# Patient Record
Sex: MALE | Race: BLACK OR AFRICAN AMERICAN | NOT HISPANIC OR LATINO | Employment: FULL TIME | URBAN - METROPOLITAN AREA
[De-identification: names, ages, dates, MRNs, and addresses within clinical notes are randomized per-mention and may not be internally consistent; named-entity substitution may affect disease eponyms.]

---

## 2017-01-11 ENCOUNTER — ALLSCRIPTS OFFICE VISIT (OUTPATIENT)
Dept: OTHER | Facility: OTHER | Age: 47
End: 2017-01-11

## 2017-04-01 ENCOUNTER — APPOINTMENT (EMERGENCY)
Dept: RADIOLOGY | Facility: HOSPITAL | Age: 47
End: 2017-04-01
Payer: COMMERCIAL

## 2017-04-01 ENCOUNTER — HOSPITAL ENCOUNTER (EMERGENCY)
Facility: HOSPITAL | Age: 47
Discharge: HOME | End: 2017-04-01
Attending: EMERGENCY MEDICINE | Admitting: EMERGENCY MEDICINE
Payer: COMMERCIAL

## 2017-04-01 ENCOUNTER — APPOINTMENT (EMERGENCY)
Dept: CT IMAGING | Facility: HOSPITAL | Age: 47
End: 2017-04-01
Payer: COMMERCIAL

## 2017-04-01 VITALS
WEIGHT: 197.53 LBS | TEMPERATURE: 99.6 F | SYSTOLIC BLOOD PRESSURE: 135 MMHG | DIASTOLIC BLOOD PRESSURE: 67 MMHG | RESPIRATION RATE: 20 BRPM | HEART RATE: 96 BPM | OXYGEN SATURATION: 96 %

## 2017-04-01 DIAGNOSIS — E86.0 DEHYDRATION: ICD-10-CM

## 2017-04-01 DIAGNOSIS — J02.0 STREP PHARYNGITIS: Primary | ICD-10-CM

## 2017-04-01 LAB
ANION GAP SERPL CALCULATED.3IONS-SCNC: 12 MMOL/L (ref 4–13)
APTT PPP: 38 SECONDS (ref 24–36)
BASOPHILS # BLD AUTO: 0.05 THOUSANDS/ΜL (ref 0–0.1)
BASOPHILS NFR BLD AUTO: 0 % (ref 0–1)
BUN SERPL-MCNC: 11 MG/DL (ref 5–25)
CALCIUM SERPL-MCNC: 8.9 MG/DL (ref 8.3–10.1)
CHLORIDE SERPL-SCNC: 95 MMOL/L (ref 100–108)
CO2 SERPL-SCNC: 24 MMOL/L (ref 21–32)
CREAT SERPL-MCNC: 1.38 MG/DL (ref 0.6–1.3)
EOSINOPHIL # BLD AUTO: 0 THOUSAND/ΜL (ref 0–0.61)
EOSINOPHIL NFR BLD AUTO: 0 % (ref 0–6)
ERYTHROCYTE [DISTWIDTH] IN BLOOD BY AUTOMATED COUNT: 15.9 % (ref 11.6–15.1)
GFR SERPL CREATININE-BSD FRML MDRD: >60 ML/MIN/1.73SQ M
GLUCOSE SERPL-MCNC: 123 MG/DL (ref 65–140)
HCT VFR BLD AUTO: 43.1 % (ref 36.5–49.3)
HGB BLD-MCNC: 14.7 G/DL (ref 12–17)
INR PPP: 1.36 (ref 0.86–1.16)
LACTATE SERPL-SCNC: 1.9 MMOL/L (ref 0.5–2)
LYMPHOCYTES # BLD AUTO: 2.16 THOUSANDS/ΜL (ref 0.6–4.47)
LYMPHOCYTES NFR BLD AUTO: 12 % (ref 14–44)
MCH RBC QN AUTO: 32 PG (ref 26.8–34.3)
MCHC RBC AUTO-ENTMCNC: 34.1 G/DL (ref 31.4–37.4)
MCV RBC AUTO: 94 FL (ref 82–98)
MONOCYTES # BLD AUTO: 2.95 THOUSAND/ΜL (ref 0.17–1.22)
MONOCYTES NFR BLD AUTO: 16 % (ref 4–12)
NEUTROPHILS # BLD AUTO: 13.45 THOUSANDS/ΜL (ref 1.85–7.62)
NEUTS SEG NFR BLD AUTO: 72 % (ref 43–75)
PLATELET # BLD AUTO: 139 THOUSANDS/UL (ref 149–390)
PMV BLD AUTO: 11.1 FL (ref 8.9–12.7)
POTASSIUM SERPL-SCNC: 4.3 MMOL/L (ref 3.5–5.3)
PROTHROMBIN TIME: 16.4 SECONDS (ref 12–14.3)
RBC # BLD AUTO: 4.59 MILLION/UL (ref 3.88–5.62)
SODIUM SERPL-SCNC: 131 MMOL/L (ref 136–145)
WBC # BLD AUTO: 18.61 THOUSAND/UL (ref 4.31–10.16)

## 2017-04-01 PROCEDURE — 96374 THER/PROPH/DIAG INJ IV PUSH: CPT

## 2017-04-01 PROCEDURE — 96375 TX/PRO/DX INJ NEW DRUG ADDON: CPT

## 2017-04-01 PROCEDURE — 80048 BASIC METABOLIC PNL TOTAL CA: CPT | Performed by: PHYSICIAN ASSISTANT

## 2017-04-01 PROCEDURE — 70491 CT SOFT TISSUE NECK W/DYE: CPT

## 2017-04-01 PROCEDURE — 83605 ASSAY OF LACTIC ACID: CPT | Performed by: PHYSICIAN ASSISTANT

## 2017-04-01 PROCEDURE — 85610 PROTHROMBIN TIME: CPT | Performed by: PHYSICIAN ASSISTANT

## 2017-04-01 PROCEDURE — 99284 EMERGENCY DEPT VISIT MOD MDM: CPT

## 2017-04-01 PROCEDURE — 87040 BLOOD CULTURE FOR BACTERIA: CPT | Performed by: PHYSICIAN ASSISTANT

## 2017-04-01 PROCEDURE — 85730 THROMBOPLASTIN TIME PARTIAL: CPT | Performed by: PHYSICIAN ASSISTANT

## 2017-04-01 PROCEDURE — 71020 HB CHEST X-RAY 2VW FRONTAL&LATL: CPT

## 2017-04-01 PROCEDURE — 96361 HYDRATE IV INFUSION ADD-ON: CPT

## 2017-04-01 PROCEDURE — 36415 COLL VENOUS BLD VENIPUNCTURE: CPT | Performed by: PHYSICIAN ASSISTANT

## 2017-04-01 PROCEDURE — 85025 COMPLETE CBC W/AUTO DIFF WBC: CPT | Performed by: PHYSICIAN ASSISTANT

## 2017-04-01 RX ORDER — AMOXICILLIN 500 MG/1
1000 CAPSULE ORAL DAILY
Qty: 18 CAPSULE | Refills: 0 | Status: SHIPPED | OUTPATIENT
Start: 2017-04-01 | End: 2017-04-04 | Stop reason: HOSPADM

## 2017-04-01 RX ORDER — KETOROLAC TROMETHAMINE 30 MG/ML
15 INJECTION, SOLUTION INTRAMUSCULAR; INTRAVENOUS ONCE
Status: COMPLETED | OUTPATIENT
Start: 2017-04-01 | End: 2017-04-01

## 2017-04-01 RX ORDER — AMOXICILLIN 250 MG/1
1000 CAPSULE ORAL ONCE
Status: COMPLETED | OUTPATIENT
Start: 2017-04-01 | End: 2017-04-01

## 2017-04-01 RX ORDER — MORPHINE SULFATE 4 MG/ML
4 INJECTION, SOLUTION INTRAMUSCULAR; INTRAVENOUS ONCE
Status: COMPLETED | OUTPATIENT
Start: 2017-04-01 | End: 2017-04-01

## 2017-04-01 RX ADMIN — KETOROLAC TROMETHAMINE 15 MG: 30 INJECTION, SOLUTION INTRAMUSCULAR at 11:38

## 2017-04-01 RX ADMIN — DEXAMETHASONE SODIUM PHOSPHATE 10 MG: 10 INJECTION INTRAMUSCULAR; INTRAVENOUS at 11:28

## 2017-04-01 RX ADMIN — MORPHINE SULFATE 4 MG: 4 INJECTION, SOLUTION INTRAMUSCULAR; INTRAVENOUS at 12:40

## 2017-04-01 RX ADMIN — AMOXICILLIN 1000 MG: 250 CAPSULE ORAL at 11:28

## 2017-04-01 RX ADMIN — IOHEXOL 85 ML: 350 INJECTION, SOLUTION INTRAVENOUS at 12:30

## 2017-04-01 RX ADMIN — SODIUM CHLORIDE 1000 ML: 0.9 INJECTION, SOLUTION INTRAVENOUS at 11:38

## 2017-04-02 ENCOUNTER — HOSPITAL ENCOUNTER (INPATIENT)
Facility: HOSPITAL | Age: 47
LOS: 1 days | Discharge: HOME | DRG: 153 | End: 2017-04-04
Attending: EMERGENCY MEDICINE | Admitting: INTERNAL MEDICINE
Payer: COMMERCIAL

## 2017-04-02 ENCOUNTER — APPOINTMENT (EMERGENCY)
Dept: CT IMAGING | Facility: HOSPITAL | Age: 47
DRG: 153 | End: 2017-04-02
Payer: COMMERCIAL

## 2017-04-02 DIAGNOSIS — J03.90 TONSILLITIS, PHLEGMONOUS: ICD-10-CM

## 2017-04-02 DIAGNOSIS — J02.0 STREP PHARYNGITIS: Primary | ICD-10-CM

## 2017-04-02 PROBLEM — B37.0 ORAL CANDIDIASIS: Status: ACTIVE | Noted: 2017-04-02

## 2017-04-02 PROBLEM — J36 TONSILLAR ABSCESS: Status: ACTIVE | Noted: 2017-04-02

## 2017-04-02 LAB
ANION GAP SERPL CALCULATED.3IONS-SCNC: 10 MMOL/L (ref 4–13)
ANISOCYTOSIS BLD QL SMEAR: PRESENT
BASOPHILS # BLD MANUAL: 0 THOUSAND/UL (ref 0–0.1)
BASOPHILS NFR MAR MANUAL: 0 % (ref 0–1)
BUN SERPL-MCNC: 22 MG/DL (ref 5–25)
CALCIUM SERPL-MCNC: 9.5 MG/DL (ref 8.3–10.1)
CHLORIDE SERPL-SCNC: 95 MMOL/L (ref 100–108)
CO2 SERPL-SCNC: 30 MMOL/L (ref 21–32)
CREAT SERPL-MCNC: 1.11 MG/DL (ref 0.6–1.3)
EOSINOPHIL # BLD MANUAL: 0 THOUSAND/UL (ref 0–0.4)
EOSINOPHIL NFR BLD MANUAL: 0 % (ref 0–6)
ERYTHROCYTE [DISTWIDTH] IN BLOOD BY AUTOMATED COUNT: 16.1 % (ref 11.6–15.1)
GFR SERPL CREATININE-BSD FRML MDRD: >60 ML/MIN/1.73SQ M
GLUCOSE SERPL-MCNC: 134 MG/DL (ref 65–140)
HCT VFR BLD AUTO: 44.9 % (ref 36.5–49.3)
HGB BLD-MCNC: 15.3 G/DL (ref 12–17)
LACTATE SERPL-SCNC: 1.9 MMOL/L (ref 0.5–2)
LYMPHOCYTES # BLD AUTO: 1.76 THOUSAND/UL (ref 0.6–4.47)
LYMPHOCYTES # BLD AUTO: 6 % (ref 14–44)
MCH RBC QN AUTO: 32.1 PG (ref 26.8–34.3)
MCHC RBC AUTO-ENTMCNC: 34.1 G/DL (ref 31.4–37.4)
MCV RBC AUTO: 94 FL (ref 82–98)
METAMYELOCYTES NFR BLD MANUAL: 1 % (ref 0–1)
MONOCYTES # BLD AUTO: 1.76 THOUSAND/UL (ref 0–1.22)
MONOCYTES NFR BLD: 6 % (ref 4–12)
NEUTROPHILS # BLD MANUAL: 25.47 THOUSAND/UL (ref 1.85–7.62)
NEUTS BAND NFR BLD MANUAL: 1 % (ref 0–8)
NEUTS SEG NFR BLD AUTO: 86 % (ref 43–75)
PLATELET # BLD AUTO: 173 THOUSANDS/UL (ref 149–390)
PLATELET BLD QL SMEAR: ADEQUATE
PMV BLD AUTO: 11.2 FL (ref 8.9–12.7)
POTASSIUM SERPL-SCNC: 5.1 MMOL/L (ref 3.5–5.3)
RBC # BLD AUTO: 4.77 MILLION/UL (ref 3.88–5.62)
SODIUM SERPL-SCNC: 135 MMOL/L (ref 136–145)
TOTAL CELLS COUNTED SPEC: 100
TOXIC GRANULES BLD QL SMEAR: PRESENT
WBC # BLD AUTO: 29.28 THOUSAND/UL (ref 4.31–10.16)
WBC TOXIC VACUOLES BLD QL SMEAR: PRESENT

## 2017-04-02 PROCEDURE — 85027 COMPLETE CBC AUTOMATED: CPT | Performed by: EMERGENCY MEDICINE

## 2017-04-02 PROCEDURE — 83605 ASSAY OF LACTIC ACID: CPT | Performed by: EMERGENCY MEDICINE

## 2017-04-02 PROCEDURE — 96376 TX/PRO/DX INJ SAME DRUG ADON: CPT

## 2017-04-02 PROCEDURE — 70491 CT SOFT TISSUE NECK W/DYE: CPT

## 2017-04-02 PROCEDURE — 99285 EMERGENCY DEPT VISIT HI MDM: CPT

## 2017-04-02 PROCEDURE — 85007 BL SMEAR W/DIFF WBC COUNT: CPT | Performed by: EMERGENCY MEDICINE

## 2017-04-02 PROCEDURE — 87040 BLOOD CULTURE FOR BACTERIA: CPT | Performed by: EMERGENCY MEDICINE

## 2017-04-02 PROCEDURE — 36415 COLL VENOUS BLD VENIPUNCTURE: CPT | Performed by: EMERGENCY MEDICINE

## 2017-04-02 PROCEDURE — 80048 BASIC METABOLIC PNL TOTAL CA: CPT | Performed by: EMERGENCY MEDICINE

## 2017-04-02 PROCEDURE — 96365 THER/PROPH/DIAG IV INF INIT: CPT

## 2017-04-02 PROCEDURE — 96375 TX/PRO/DX INJ NEW DRUG ADDON: CPT

## 2017-04-02 RX ORDER — METOPROLOL SUCCINATE 50 MG/1
50 TABLET, EXTENDED RELEASE ORAL DAILY
COMMUNITY
End: 2018-05-23 | Stop reason: ALTCHOICE

## 2017-04-02 RX ORDER — SODIUM CHLORIDE 9 MG/ML
100 INJECTION, SOLUTION INTRAVENOUS CONTINUOUS
Status: DISCONTINUED | OUTPATIENT
Start: 2017-04-02 | End: 2017-04-04 | Stop reason: HOSPADM

## 2017-04-02 RX ORDER — ONDANSETRON 2 MG/ML
4 INJECTION INTRAMUSCULAR; INTRAVENOUS EVERY 4 HOURS PRN
Status: DISCONTINUED | OUTPATIENT
Start: 2017-04-02 | End: 2017-04-04 | Stop reason: HOSPADM

## 2017-04-02 RX ORDER — CLINDAMYCIN PHOSPHATE 600 MG/50ML
600 INJECTION INTRAVENOUS EVERY 8 HOURS
Status: DISCONTINUED | OUTPATIENT
Start: 2017-04-03 | End: 2017-04-04 | Stop reason: HOSPADM

## 2017-04-02 RX ORDER — ONDANSETRON 2 MG/ML
4 INJECTION INTRAMUSCULAR; INTRAVENOUS ONCE
Status: COMPLETED | OUTPATIENT
Start: 2017-04-02 | End: 2017-04-02

## 2017-04-02 RX ORDER — MORPHINE SULFATE 4 MG/ML
4 INJECTION, SOLUTION INTRAMUSCULAR; INTRAVENOUS EVERY 4 HOURS PRN
Status: DISCONTINUED | OUTPATIENT
Start: 2017-04-02 | End: 2017-04-04 | Stop reason: HOSPADM

## 2017-04-02 RX ORDER — ACETAMINOPHEN 325 MG/1
650 TABLET ORAL EVERY 6 HOURS PRN
Status: DISCONTINUED | OUTPATIENT
Start: 2017-04-02 | End: 2017-04-04 | Stop reason: HOSPADM

## 2017-04-02 RX ORDER — CLINDAMYCIN PHOSPHATE 600 MG/50ML
600 INJECTION INTRAVENOUS ONCE
Status: COMPLETED | OUTPATIENT
Start: 2017-04-02 | End: 2017-04-02

## 2017-04-02 RX ORDER — MORPHINE SULFATE 4 MG/ML
4 INJECTION, SOLUTION INTRAMUSCULAR; INTRAVENOUS ONCE
Status: COMPLETED | OUTPATIENT
Start: 2017-04-02 | End: 2017-04-02

## 2017-04-02 RX ORDER — NICOTINE 21 MG/24HR
1 PATCH, TRANSDERMAL 24 HOURS TRANSDERMAL DAILY
Status: DISCONTINUED | OUTPATIENT
Start: 2017-04-03 | End: 2017-04-04 | Stop reason: HOSPADM

## 2017-04-02 RX ORDER — METOPROLOL SUCCINATE 50 MG/1
50 TABLET, EXTENDED RELEASE ORAL DAILY
Status: DISCONTINUED | OUTPATIENT
Start: 2017-04-03 | End: 2017-04-04 | Stop reason: HOSPADM

## 2017-04-02 RX ORDER — ONDANSETRON 2 MG/ML
INJECTION INTRAMUSCULAR; INTRAVENOUS
Status: DISPENSED
Start: 2017-04-02 | End: 2017-04-03

## 2017-04-02 RX ADMIN — ONDANSETRON 4 MG: 2 INJECTION INTRAMUSCULAR; INTRAVENOUS at 21:15

## 2017-04-02 RX ADMIN — MORPHINE SULFATE 4 MG: 4 INJECTION, SOLUTION INTRAMUSCULAR; INTRAVENOUS at 19:19

## 2017-04-02 RX ADMIN — ONDANSETRON 4 MG: 2 INJECTION INTRAMUSCULAR; INTRAVENOUS at 19:19

## 2017-04-02 RX ADMIN — CLINDAMYCIN PHOSPHATE 600 MG: 12 INJECTION, SOLUTION INTRAVENOUS at 19:36

## 2017-04-02 RX ADMIN — SODIUM CHLORIDE 100 ML/HR: 0.9 INJECTION, SOLUTION INTRAVENOUS at 19:34

## 2017-04-02 RX ADMIN — IOHEXOL 85 ML: 350 INJECTION, SOLUTION INTRAVENOUS at 20:47

## 2017-04-03 PROBLEM — I10 ESSENTIAL HYPERTENSION: Status: ACTIVE | Noted: 2017-04-03

## 2017-04-03 PROBLEM — I42.0 DILATED CARDIOMYOPATHY (HCC): Status: ACTIVE | Noted: 2017-04-03

## 2017-04-03 PROBLEM — I50.22 CHRONIC SYSTOLIC HEART FAILURE (HCC): Status: ACTIVE | Noted: 2017-04-03

## 2017-04-03 LAB
ANION GAP SERPL CALCULATED.3IONS-SCNC: 8 MMOL/L (ref 4–13)
BUN SERPL-MCNC: 24 MG/DL (ref 5–25)
C DIFF TOX GENS STL QL NAA+PROBE: NORMAL
CALCIUM SERPL-MCNC: 8.7 MG/DL (ref 8.3–10.1)
CHLORIDE SERPL-SCNC: 99 MMOL/L (ref 100–108)
CO2 SERPL-SCNC: 28 MMOL/L (ref 21–32)
CREAT SERPL-MCNC: 1.17 MG/DL (ref 0.6–1.3)
ERYTHROCYTE [DISTWIDTH] IN BLOOD BY AUTOMATED COUNT: 16 % (ref 11.6–15.1)
GFR SERPL CREATININE-BSD FRML MDRD: >60 ML/MIN/1.73SQ M
GLUCOSE P FAST SERPL-MCNC: 188 MG/DL (ref 65–99)
GLUCOSE SERPL-MCNC: 188 MG/DL (ref 65–140)
HCT VFR BLD AUTO: 39.4 % (ref 36.5–49.3)
HGB BLD-MCNC: 13.4 G/DL (ref 12–17)
MCH RBC QN AUTO: 32.2 PG (ref 26.8–34.3)
MCHC RBC AUTO-ENTMCNC: 34 G/DL (ref 31.4–37.4)
MCV RBC AUTO: 95 FL (ref 82–98)
PLATELET # BLD AUTO: 167 THOUSANDS/UL (ref 149–390)
PMV BLD AUTO: 11.4 FL (ref 8.9–12.7)
POTASSIUM SERPL-SCNC: 4.3 MMOL/L (ref 3.5–5.3)
RBC # BLD AUTO: 4.16 MILLION/UL (ref 3.88–5.62)
SODIUM SERPL-SCNC: 135 MMOL/L (ref 136–145)
WBC # BLD AUTO: 25.08 THOUSAND/UL (ref 4.31–10.16)

## 2017-04-03 PROCEDURE — 85027 COMPLETE CBC AUTOMATED: CPT | Performed by: PHYSICIAN ASSISTANT

## 2017-04-03 PROCEDURE — 87493 C DIFF AMPLIFIED PROBE: CPT | Performed by: INTERNAL MEDICINE

## 2017-04-03 PROCEDURE — 80048 BASIC METABOLIC PNL TOTAL CA: CPT | Performed by: PHYSICIAN ASSISTANT

## 2017-04-03 RX ADMIN — NYSTATIN 500000 UNITS: 100000 SUSPENSION ORAL at 00:27

## 2017-04-03 RX ADMIN — DEXAMETHASONE SODIUM PHOSPHATE 6 MG: 10 INJECTION, SOLUTION INTRAMUSCULAR; INTRAVENOUS at 23:55

## 2017-04-03 RX ADMIN — DEXAMETHASONE SODIUM PHOSPHATE 10 MG: 10 INJECTION, SOLUTION INTRAMUSCULAR; INTRAVENOUS at 01:00

## 2017-04-03 RX ADMIN — METOPROLOL SUCCINATE 50 MG: 50 TABLET, FILM COATED, EXTENDED RELEASE ORAL at 08:31

## 2017-04-03 RX ADMIN — NICOTINE 1 PATCH: 14 PATCH TRANSDERMAL at 08:31

## 2017-04-03 RX ADMIN — CLINDAMYCIN PHOSPHATE 600 MG: 12 INJECTION, SOLUTION INTRAMUSCULAR; INTRAVENOUS at 04:00

## 2017-04-03 RX ADMIN — NYSTATIN 500000 UNITS: 100000 SUSPENSION ORAL at 22:12

## 2017-04-03 RX ADMIN — CLINDAMYCIN PHOSPHATE 600 MG: 12 INJECTION, SOLUTION INTRAMUSCULAR; INTRAVENOUS at 19:49

## 2017-04-03 RX ADMIN — DEXAMETHASONE SODIUM PHOSPHATE 10 MG: 10 INJECTION, SOLUTION INTRAMUSCULAR; INTRAVENOUS at 08:40

## 2017-04-03 RX ADMIN — NYSTATIN 500000 UNITS: 100000 SUSPENSION ORAL at 08:31

## 2017-04-03 RX ADMIN — MORPHINE SULFATE 4 MG: 4 INJECTION, SOLUTION INTRAMUSCULAR; INTRAVENOUS at 07:40

## 2017-04-03 RX ADMIN — ENOXAPARIN SODIUM 40 MG: 40 INJECTION SUBCUTANEOUS at 08:31

## 2017-04-03 RX ADMIN — CLINDAMYCIN PHOSPHATE 600 MG: 12 INJECTION, SOLUTION INTRAMUSCULAR; INTRAVENOUS at 10:51

## 2017-04-03 RX ADMIN — MORPHINE SULFATE 4 MG: 4 INJECTION, SOLUTION INTRAMUSCULAR; INTRAVENOUS at 00:26

## 2017-04-03 RX ADMIN — DEXAMETHASONE SODIUM PHOSPHATE 6 MG: 10 INJECTION, SOLUTION INTRAMUSCULAR; INTRAVENOUS at 17:15

## 2017-04-03 RX ADMIN — NYSTATIN 500000 UNITS: 100000 SUSPENSION ORAL at 12:41

## 2017-04-03 RX ADMIN — NYSTATIN 500000 UNITS: 100000 SUSPENSION ORAL at 17:15

## 2017-04-03 RX ADMIN — SODIUM CHLORIDE 100 ML/HR: 0.9 INJECTION, SOLUTION INTRAVENOUS at 22:12

## 2017-04-03 RX ADMIN — SODIUM CHLORIDE 100 ML/HR: 0.9 INJECTION, SOLUTION INTRAVENOUS at 00:27

## 2017-04-04 VITALS
BODY MASS INDEX: 29.36 KG/M2 | SYSTOLIC BLOOD PRESSURE: 149 MMHG | HEIGHT: 69 IN | RESPIRATION RATE: 19 BRPM | OXYGEN SATURATION: 96 % | DIASTOLIC BLOOD PRESSURE: 70 MMHG | HEART RATE: 50 BPM | WEIGHT: 198.19 LBS | TEMPERATURE: 97.5 F

## 2017-04-04 RX ORDER — CLINDAMYCIN HYDROCHLORIDE 300 MG/1
300 CAPSULE ORAL 3 TIMES DAILY
Qty: 24 CAPSULE | Refills: 0 | Status: SHIPPED | OUTPATIENT
Start: 2017-04-04 | End: 2017-04-12

## 2017-04-04 RX ORDER — ACETAMINOPHEN 325 MG/1
650 TABLET ORAL EVERY 6 HOURS PRN
Qty: 30 TABLET | Refills: 0 | Status: SHIPPED | OUTPATIENT
Start: 2017-04-04 | End: 2017-05-04

## 2017-04-04 RX ORDER — DEXAMETHASONE 4 MG/1
4 TABLET ORAL 2 TIMES DAILY WITH MEALS
Qty: 6 TABLET | Refills: 0 | Status: SHIPPED | OUTPATIENT
Start: 2017-04-04 | End: 2017-04-07

## 2017-04-04 RX ORDER — NICOTINE 21 MG/24HR
1 PATCH, TRANSDERMAL 24 HOURS TRANSDERMAL DAILY
Qty: 30 PATCH | Refills: 0 | Status: SHIPPED | OUTPATIENT
Start: 2017-04-04 | End: 2017-05-04

## 2017-04-04 RX ADMIN — METOPROLOL SUCCINATE 50 MG: 50 TABLET, FILM COATED, EXTENDED RELEASE ORAL at 08:42

## 2017-04-04 RX ADMIN — NYSTATIN 500000 UNITS: 100000 SUSPENSION ORAL at 08:42

## 2017-04-04 RX ADMIN — DEXAMETHASONE SODIUM PHOSPHATE 6 MG: 10 INJECTION, SOLUTION INTRAMUSCULAR; INTRAVENOUS at 07:52

## 2017-04-04 RX ADMIN — NICOTINE 1 PATCH: 14 PATCH TRANSDERMAL at 08:43

## 2017-04-04 RX ADMIN — CLINDAMYCIN PHOSPHATE 600 MG: 12 INJECTION, SOLUTION INTRAMUSCULAR; INTRAVENOUS at 04:00

## 2017-04-04 RX ADMIN — ENOXAPARIN SODIUM 40 MG: 40 INJECTION SUBCUTANEOUS at 08:42

## 2017-04-06 LAB
BACTERIA BLD CULT: NORMAL
BACTERIA BLD CULT: NORMAL

## 2017-04-08 LAB
BACTERIA BLD CULT: NORMAL
BACTERIA BLD CULT: NORMAL

## 2017-04-19 ENCOUNTER — ALLSCRIPTS OFFICE VISIT (OUTPATIENT)
Dept: OTHER | Facility: OTHER | Age: 47
End: 2017-04-19

## 2017-05-01 ENCOUNTER — ALLSCRIPTS OFFICE VISIT (OUTPATIENT)
Dept: OTHER | Facility: OTHER | Age: 47
End: 2017-05-01

## 2017-09-11 ENCOUNTER — ALLSCRIPTS OFFICE VISIT (OUTPATIENT)
Dept: OTHER | Facility: OTHER | Age: 47
End: 2017-09-11

## 2017-11-16 ENCOUNTER — ALLSCRIPTS OFFICE VISIT (OUTPATIENT)
Dept: OTHER | Facility: OTHER | Age: 47
End: 2017-11-16

## 2017-11-16 DIAGNOSIS — I10 ESSENTIAL (PRIMARY) HYPERTENSION: ICD-10-CM

## 2017-11-17 NOTE — PROGRESS NOTES
Assessment  Assessed    1  Benign essential hypertension (401 1) (I10)   2  Chronic systolic heart failure (333 74) (I50 22)   3  Dilated cardiomyopathy (425 4) (I42 0)    Plan  Benign essential hypertension    · (1) HEPATIC FUNCTION PANEL; Status:Active; Requested for:2017;    Perform:[facility]; [id number]; Ordered; For:Benign essential hypertension; Ordered By:Thiago Lin;   · (1) LIPID PANEL, FASTING; Status:Active; Requested for:2017;    Perform:[facility]; ALL:50YHE3103; Ordered;essential hypertension; Ordered By:hTiago Lin; Chronic systolic heart failure    · Follow-up visit in 6 months Evaluation and Treatment  Follow-up  Status: Hold For -Scheduling  Requested for: 27OSE0480   Ordered;Chronic systolic heart failure; Ordered By: Isabel Portillo Performed:  Due: 80NFG4295   · A diet that is low in fat, cholesterol, and sodium is considered a cardiac diet  ;Status:Complete;   Done: 39PYK9008 64:37MC   Ordered;systolic heart failure; Ordered By:Thiago Lin;   · Begin or continue regular aerobic exercise  Gradually work up to at least {count1}sessions of {dur1} of exercise a week ; Status:Complete;   Done: 43DXU5765 86:99TL   Ordered;systolic heart failure; Ordered By:Thiago Lin;   · Do not take anti-inflammatory medicines other than aspirin ; Status:Complete;   Done:2017 01:38PM   Ordered; For:Chronic systolic heart failure; Ordered By:Thiago Lin;   · Weigh yourself every day ; Status:Complete;   Done: 84HFM6933 64:47AE   Ordered;systolic heart failure; Ordered By:Thiago Lin; Discussion/Summary  Cardiology Discussion Summary Free Text Note Form [facility]:   Chronic systolic CHF with dilated CM and EF 15-20%: weight is stable since last visit, but remains euvolemic on exam  Continue current regimen  s/p LifeVest and now ICD   Considering that he has not been able to take an ACE-I due to reduced renal function and had been taking subtherapeutic doses of coreg, we rechecked echo, which revealed EF of 45%  Now continues on bisoprolol and doing well   well controlled, continue current regimen  Will check a fasting lipid profile before next visit  Counseling Documentation With Imm: The patient was counseled regarding diagnostic results,-- instructions for management,-- risk factor reductions,-- impressions  total time of encounter was 25 minutes-- and-- 15 minutes was spent counseling  Chief Complaint  Chief Complaint Free Text Note Form: Patient is here today for 6 mo f/u  Patient c/o Fatigue  History of Present Illness  Cardiology (Follow-Up): The patient states he has been generally doing well since the last visit  Comorbid Illnesses: hypertension-- and-- NICM - EF 15-20%; s/p ICD  Interval Events: feels well overall, no significant symptoms other than fatigue with working night shift; normal device checks thus far  Symptoms: denies chest pain at rest,-- denies exertional chest pain,-- denies dyspnea,-- denies fatigue,-- denies exercise intolerance,-- denies palpitations,-- denies edema,-- denies orthopnea,-- denies claudication,-- denies dizziness-- and-- denies orthostatic dizziness  Associated symptoms: weight stable, but-- no syncope,-- no PND,-- no tendency for easy bleeding,-- no tendency for easy bruising,-- no TIA symptoms-- and-- no recent weight gain  Disease Monitoring: The patient has had a stable weight  Medications: the patient is adherent with his medication regimen  -- He denies medication side effects  Review of Systems  Cardiology Male ROS:    Cardiac: No complaints of chest pain, no palpitations, no fainiting  Skin: No complaints of nonhealing sores or skin rash  Genitourinary: No complaints of recurrent urinary tract infections, frequent urination at night, difficult urination, blood in urine, kidney stones, loss of bladder control, no kidney or prostate problems, no erectile dysfunction  Psychological: No complaints of feeling depressed, anxiety, panic attacks, or difficulty concentrating  General: No complaints of trouble sleeping, lack of energy, fatigue, appetite changes, weight changes, fever, frequent infections, or night sweats  Respiratory: No complaints of shortness of breath, cough with sputum, or wheezing  HEENT: No complaints of serious problems, hearing problems, nose problems, throat problems, or snoring  Gastrointestinal: No complaints of liver problems, nausea, vomiting, heartburn, constipation, bloody stools, diarrhea, problems swallowing, adbominal pain, or rectal bleeding  Hematologic: No complaints of bleeding disorders, anemia, blood clots, or excessive brusing  Neurological: No complaints of numbness, tingling, dizziness, weakness, seizures, headaches, syncope or fainting, AM fatigue, daytime sleepiness, no witnessed apnea episodes  Musculoskeletal: No complaints of arthritis, back pain, or painfull swelling  ROS Reviewed:   ROS reviewed  Active Problems  Problems    1  Acute renal insufficiency (593 9) (N28 9)   2  Benign essential hypertension (401 1) (I10)   3  Chronic systolic heart failure (842 64) (I50 22)   4  Congestive heart failure (428 0) (I50 9)   5  Dilated cardiomyopathy (425 4) (I42 0)   6  Edema (782 3) (R60 9)   7  Elevated liver enzymes (790 5) (R74 8)   8  Generalized convulsive seizure (780 39) (R56 9)   9  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)   10  Hypomagnesemia (275 2) (E83 42)   11  Hyponatremia (276 1) (E87 1)   12  Leg cramps, sleep related (327 52) (G47 62)    Past Medical History  Problems    1  History of Cardiac Cath Procedures Performed  Active Problems And Past Medical History Reviewed: The active problems and past medical history were reviewed and updated today  Surgical History  Problems    1  History of Appendectomy   2  History of Shldr Arthrosc W/ Distal Claviculectomy Incl Distal Art Surf  Surgical History Reviewed:    The surgical history was reviewed and updated today  Family History  Mother    1  Denied: Family history of Colon cancer   2  Denied: Family history of Crohn's disease without complication, unspecified gastrointestinal tract location   3  Denied: Family history of liver disease  Father    4  Denied: Family history of Colon cancer   5  Denied: Family history of Crohn's disease without complication, unspecified gastrointestinal tract location   6  Denied: Family history of liver disease   7  Family history of Hypertension (V17 49)  Family History Reviewed: The family history was reviewed and updated today  Social History  Problems    · Being A Social Drinker   · Current Smoker (305 1)   · Denied: History of Drug Use  Social History Reviewed: The social history was reviewed and updated today  The social history was reviewed and is unchanged  Current Meds   1  Bystolic 5 MG Oral Tablet; TAKE 1 TABLET DAILY; Therapy: 31Zcn9075 to (Evaluate:03Wln3383)  Requested for: 65RQQ8048; Last Rx:06Jox8446 Ordered   2  Magnesium Oxide 400 MG Oral Tablet; TAKE 1 TABLET TWICE DAILY; Therapy: 29Xes3142 to (Evaluate:55Pbe6780)  Requested for: 27Oct2015; Last Rx:27Oct2015 Ordered   3  Potassium Chloride ER 10 MEQ Oral Capsule Extended Release; PRN; Therapy: 65IDZ4875 to  Requested for: 42Zbq8287 Recorded   4  ZyrTEC Allergy TABS; TAKE 1 TABLET DAILY AS NEEDED; Therapy: (Recorded:75Zbo7257) to Recorded  Medication List Reviewed: The medication list was reviewed and updated today  Allergies  Medication    1  No Known Drug Allergies    Vitals  Vital Signs    Recorded: 35WVH4624 01:28PM   Heart Rate 79   Systolic 701, LUE, Sitting   Diastolic 82, LUE, Sitting   Height 5 ft 9 in   Weight 213 lb 1 oz   BMI Calculated 31 46   BSA Calculated 2 12   O2 Saturation 97       Physical Exam   Constitutional - General appearance: No acute distress, well appearing and well nourished    Eyes - Conjunctiva and Sclera examination: Conjunctiva pink, sclera anicteric  Neck - Normal, no JVD   Pulmonary - Respiratory effort: No signs of respiratory distress  -- Auscultation of lungs: Clear to auscultation  Cardiovascular - Auscultation of heart: Normal rate and rhythm, normal S1 and S2, no murmurs  -- Pedal pulses: Normal, 2+ bilaterally  -- Examination of extremities for edema and/or varicosities: Normal    Abdomen - Soft  Musculoskeletal - Gait and station: Normal gait  Skin - Skin: Normal without rashes  Skin is warm and well perfused  Neurologic - Speech normal  No focal deficits  Psychiatric - Orientation to person, place, and time: Normal       Results/Data  ECG Report:  Rhythm and rate:  normal sinus rhythm  P-waves:   the P wave is normal -- UT interval is normal   QRS: left ventricular hypertrophy  T waves: repolarization abnormality        Future Appointments    Date/Time Provider Specialty Site   04/25/2018 03:00 PM Cardiology, [address] [facility]   12/11/2017 03:00 PM Cardiology, Device Remote  [address]       Signatures   Electronically signed by : TRISH Anderson ; Nov 16 2017  1:42PM EST                       (Author)

## 2017-12-11 ENCOUNTER — GENERIC CONVERSION - ENCOUNTER (OUTPATIENT)
Dept: OTHER | Facility: OTHER | Age: 47
End: 2017-12-11

## 2017-12-11 ENCOUNTER — ALLSCRIPTS OFFICE VISIT (OUTPATIENT)
Dept: OTHER | Facility: OTHER | Age: 47
End: 2017-12-11

## 2018-01-14 VITALS
BODY MASS INDEX: 29.98 KG/M2 | WEIGHT: 202.38 LBS | HEART RATE: 72 BPM | DIASTOLIC BLOOD PRESSURE: 68 MMHG | HEIGHT: 69 IN | SYSTOLIC BLOOD PRESSURE: 126 MMHG

## 2018-01-14 VITALS
BODY MASS INDEX: 31.56 KG/M2 | HEART RATE: 79 BPM | SYSTOLIC BLOOD PRESSURE: 142 MMHG | WEIGHT: 213.06 LBS | OXYGEN SATURATION: 97 % | DIASTOLIC BLOOD PRESSURE: 82 MMHG | HEIGHT: 69 IN

## 2018-01-15 NOTE — CONSULTS
I had the pleasure of evaluating your patient, Jagruti Aguero  My full evaluation follows:      Chief Complaint  Patient is here today for 6 mo f/u  Patient c/o Fatigue  History of Present Illness  The patient states he has been generally doing well since the last visit  Comorbid Illnesses: hypertension and NICM - EF 15-20%; s/p ICD  Interval Events: feels well overall, no significant symptoms other than fatigue with working night shift; normal device checks thus far  Symptoms: denies chest pain at rest, denies exertional chest pain, denies dyspnea, denies fatigue, denies exercise intolerance, denies palpitations, denies edema, denies orthopnea, denies claudication, denies dizziness and denies orthostatic dizziness  Associated symptoms: weight stable, but no syncope, no PND, no tendency for easy bleeding, no tendency for easy bruising, no TIA symptoms and no recent weight gain  Disease Monitoring: The patient has had a stable weight  Medications: the patient is adherent with his medication regimen  He denies medication side effects  Review of Systems      Cardiac: No complaints of chest pain, no palpitations, no fainiting  Skin: No complaints of nonhealing sores or skin rash  Genitourinary: No complaints of recurrent urinary tract infections, frequent urination at night, difficult urination, blood in urine, kidney stones, loss of bladder control, no kidney or prostate problems, no erectile dysfunction  Psychological: No complaints of feeling depressed, anxiety, panic attacks, or difficulty concentrating  General: No complaints of trouble sleeping, lack of energy, fatigue, appetite changes, weight changes, fever, frequent infections, or night sweats  Respiratory: No complaints of shortness of breath, cough with sputum, or wheezing  HEENT: No complaints of serious problems, hearing problems, nose problems, throat problems, or snoring     Gastrointestinal: No complaints of liver problems, nausea, vomiting, heartburn, constipation, bloody stools, diarrhea, problems swallowing, adbominal pain, or rectal bleeding  Hematologic: No complaints of bleeding disorders, anemia, blood clots, or excessive brusing  Neurological: No complaints of numbness, tingling, dizziness, weakness, seizures, headaches, syncope or fainting, AM fatigue, daytime sleepiness, no witnessed apnea episodes  Musculoskeletal: No complaints of arthritis, back pain, or painfull swelling  ROS reviewed  Active Problems    1  Acute renal insufficiency (593 9) (N28 9)   2  Benign essential hypertension (401 1) (I10)   3  Chronic systolic heart failure (270 76) (I50 22)   4  Congestive heart failure (428 0) (I50 9)   5  Dilated cardiomyopathy (425 4) (I42 0)   6  Edema (782 3) (R60 9)   7  Elevated liver enzymes (790 5) (R74 8)   8  Generalized convulsive seizure (780 39) (R56 9)   9  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)   10  Hypomagnesemia (275 2) (E83 42)   11  Hyponatremia (276 1) (E87 1)   12  Leg cramps, sleep related (327 52) (G47 62)    Past Medical History    · History of Cardiac Cath Procedures Performed    The active problems and past medical history were reviewed and updated today  Surgical History    · History of Appendectomy   · History of Shldr Arthrosc W/ Distal Claviculectomy Incl Distal Art Surf    The surgical history was reviewed and updated today  Family History    · Denied: Family history of Colon cancer   · Denied: Family history of Crohn's disease without complication, unspecified  gastrointestinal tract location   · Denied: Family history of liver disease    · Denied: Family history of Colon cancer   · Denied: Family history of Crohn's disease without complication, unspecified  gastrointestinal tract location   · Denied: Family history of liver disease   · Family history of Hypertension (V17 49)    The family history was reviewed and updated today         Social History    · Being A Social Drinker   · Current Smoker (305 1)   · Denied: History of Drug Use  The social history was reviewed and updated today  The social history was reviewed and is unchanged  Current Meds   1  Bystolic 5 MG Oral Tablet; TAKE 1 TABLET DAILY; Therapy: 22Mqb9921 to (Evaluate:63Fqb4925)  Requested for: 98ZLC7727; Last   Rx:12Jun2017 Ordered   2  Magnesium Oxide 400 MG Oral Tablet; TAKE 1 TABLET TWICE DAILY; Therapy: 74Jzi0746 to (Evaluate:94Kbq7474)  Requested for: 27Oct2015; Last   Rx:27Oct2015 Ordered   3  Potassium Chloride ER 10 MEQ Oral Capsule Extended Release; PRN; Therapy: 62JDG5843 to  Requested for: 51Ufg6467 Recorded   4  ZyrTEC Allergy TABS; TAKE 1 TABLET DAILY AS NEEDED; Therapy: (Recorded:48Khk9823) to Recorded    The medication list was reviewed and updated today  Allergies    1  No Known Drug Allergies    Vitals   Recorded: 14PRX7721 01:28PM   Heart Rate 79   Systolic 577, LUE, Sitting   Diastolic 82, LUE, Sitting   Height 5 ft 9 in   Weight 213 lb 1 oz   BMI Calculated 31 46   BSA Calculated 2 12   O2 Saturation 97     Physical Exam    Constitutional - General appearance: No acute distress, well appearing and well nourished  Eyes - Conjunctiva and Sclera examination: Conjunctiva pink, sclera anicteric  Neck - Normal, no JVD   Pulmonary - Respiratory effort: No signs of respiratory distress  Auscultation of lungs: Clear to auscultation  Cardiovascular - Auscultation of heart: Normal rate and rhythm, normal S1 and S2, no murmurs  Pedal pulses: Normal, 2+ bilaterally  Examination of extremities for edema and/or varicosities: Normal     Abdomen - Soft  Musculoskeletal - Gait and station: Normal gait  Skin - Skin: Normal without rashes  Skin is warm and well perfused  Neurologic - Speech normal  No focal deficits  Psychiatric - Orientation to person, place, and time: Normal       Results/Data    Rhythm and rate:  normal sinus rhythm  P-waves:   the P wave is normal  PA interval is normal    QRS: left ventricular hypertrophy   T waves: repolarization abnormality  Assessment    1  Benign essential hypertension (401 1) (I10)   2  Chronic systolic heart failure (363 18) (I50 22)   3  Dilated cardiomyopathy (425 4) (I42 0)    Plan  Benign essential hypertension    · (1) HEPATIC FUNCTION PANEL; Status:Active; Requested for:16Nov2017;    Perform:[facility]; HIN:73TMC2404; Ordered; For:Benign essential hypertension; Ordered By:Thiago Lin;   · (1) LIPID PANEL, FASTING; Status:Active; Requested for:16Nov2017;    Perform:[facility]; OMW:68RUK8104; Ordered; For:Benign essential hypertension; Ordered By:Thiago Lin; Chronic systolic heart failure    · Follow-up visit in 6 months Evaluation and Treatment  Follow-up  Status: Hold For -  Scheduling  Requested for: 02GNZ3498   Ordered; For: Chronic systolic heart failure; Ordered By: Nicko Gomez Performed:  Due: 33NGK1591   · A diet that is low in fat, cholesterol, and sodium is considered a cardiac diet ;  Status:Complete;   Done: 68CEN0173 01:38PM   Ordered; For:Chronic systolic heart failure; Ordered By:Thiago Lin;   · Begin or continue regular aerobic exercise  Gradually work up to at least {count1}  sessions of {dur1} of exercise a week ; Status:Complete;   Done: 30JVX6405 01:38PM   Ordered; For:Chronic systolic heart failure; Ordered By:Thiago Lin;   · Do not take anti-inflammatory medicines other than aspirin ; Status:Complete;   Done:  05SMI1979 01:38PM   Ordered; For:Chronic systolic heart failure; Ordered By:Thiago Lin;   · Weigh yourself every day ; Status:Complete;   Done: 96LCR3679 01:38PM   Ordered; For:Chronic systolic heart failure; Ordered By:Thiago Lin; Discussion/Summary    Chronic systolic CHF with dilated CM and EF 15-20%: weight is stable since last visit, but remains euvolemic on exam  Continue current regimen  s/p LifeVest and now ICD   Considering that he has not been able to take an ACE-I due to reduced renal function and had been taking subtherapeutic doses of coreg, we rechecked echo, which revealed EF of 45%  Now continues on bisoprolol and doing well  HTN: well controlled, continue current regimen  Will check a fasting lipid profile before next visit  The patient was counseled regarding diagnostic results, instructions for management, risk factor reductions, impressions  total time of encounter was 25 minutes and 15 minutes was spent counseling  Thank you very much for allowing me to participate in the care of this patient  If you have any questions, please do not hesitate to contact me        Future Appointments    Signatures   Electronically signed by : TRISH Rowe ; Nov 16 2017  1:42PM EST                       (Author)

## 2018-03-07 NOTE — PROGRESS NOTES
"  Discussion/Summary  Normal device function      Results/Data  Cardiac Device Remote [id number] 05:54PM Block Ferain     Test Name Result Flag Reference   MISCELLANEOUS COMMENT      LATITUDE TRANSMISSION ( SUBq )  [id number] STATUS "OK" - APPROX  76% BATTERY LONGEVITY AVAILABLE  PRESENTING - NSR @ 72 BPM  NO HIGH RATE EPISODES  ALL PARAMETERS APPEAR WITHIN NORMAL LIMITS  APPROPRIATELY FUNCTIONING SUBq ICD  eb   Cardiac Electrophysiology Report      [id number] latitude  12 11 17 pdf   DEVICE TYPE ICD       Cardiac Electrophysiology Report [id number] 05:54PM Block Efrain     Test Name Result Flag Reference   Cardiac Electrophysiology Report      [id number]  pdf     Signatures   Electronically signed by : Madeline Martin, ; Dec 11 2017  3:51PM EST                       (Author)    Electronically signed by : TRISH Durand ; Dec 11 2017  5:37PM EST                       (Author)    "

## 2018-03-07 NOTE — PROGRESS NOTES
"  Discussion/Summary  Normal device function      Results/Data  Cardiac Device In Clinic [id number] 06:39PM Lincoln Martino     Test Name Result Flag Reference   MISCELLANEOUS COMMENT      SQ ICD INTERROGATED IN THE OFFICE BY COMPANY REP  BATTERY STATUS "OK"- 90% REMAINING BATTERY LIFE  NO SIGNIFICANT HIGH RATE EPISODES  ALL TESTING WITHIN NORMAL LIMITS  NORMAL DEVICE FUNCTION  GV   Cardiac Electrophysiology Report      [id number]  pdf   DEVICE TYPE ICD       Cardiac Electrophysiology Report [id number] 06:39PM Lincoln Martino     Test Name Result Flag Reference   Cardiac Electrophysiology Report      fingufn[id number] pdf     Signatures   Electronically signed by : Pina Montejo RN; Oct 11 2016  2:55PM EST                       (Author)    Electronically signed by : TRISH Belle ; Oct 21 2016  3:53PM EST                       (Author)    "

## 2018-03-07 NOTE — PROGRESS NOTES
"  Discussion/Summary  Normal device function      Results/Data  Results   Cardiac Device In Clinic [id number] 07:56PM Delorismohan Jack     Test Name Result Flag Reference   MISCELLANEOUS COMMENT      INTERROGATED IN THE OFFICE BY COMPANY REP  NORMAL DEVICE FUNCTION  ALL OTHER AVAILABLE TESTING WITHIN NORMAL LIMITS  ---[place]   Cardiac Electrophysiology Report      [email or web address]  [id number]  pdf   DEVICE TYPE ICD       Cardiac Electrophysiology Report [id number] 07:56PM Deloris Jack     Test Name Result Flag Reference   Cardiac Electrophysiology Report      [id number] pdf     Signatures   Electronically signed by : Tami Castanon, ; Mar 24 2016  4:10PM EST                       (Author)    Electronically signed by : Johnny Gomez DO; Mar 26 2016  2:02PM EST                       (Author)    "

## 2018-03-07 NOTE — PROGRESS NOTES
"  Discussion/Summary  Normal device function      Results/Data  Cardiac Device In Clinic 19Apr2017 07:49PM Maria C Hodgkins     Test Name Result Flag Reference   MISCELLANEOUS COMMENT      SQ ICD INTERROGATED IN THE OFFICE BY COMPANY REP  BATTERY STATUS "OK"- 84% REMAINING BATTERY LIFE  ALL TESTING WITHIN NORMAL LIMITS  NO SIGNIFICANT HIGH RATE EPISODES  NORMAL DEVICE FUNCTION  GV   Cardiac Electrophysiology Report      [id number]  pdf   DEVICE TYPE ICD       Cardiac Electrophysiology Report 19Apr2017 07:49PM Maria C Hodgkins     Test Name Result Flag Reference   Cardiac Electrophysiology Report      [id number]  pdf     Signatures   Electronically signed by : Gregory Ivey RN; Apr 19 2017  3:53PM EST                       (Author)    Electronically signed by : TRISH Corea ; Apr 20 2017  8:53PM EST                       (Author)    "

## 2018-03-07 NOTE — PROGRESS NOTES
"  Discussion/Summary  Normal device function      Results/Data  Card[facility] Device In Clinic [id number] 08:56PM [facility]     Test Name Result Flag Reference   MISCELLANEOUS COMMENT (Report)     DEVICE INTERROGATED IN THE [place] OFFICE BY COMPANY REP; BATTERY VOLTAGE ADEQUATE (87%); NO SIGNIFICANT HIGH RATE EPISODES; R WAVES MEASURED IN ALL (3) VECTORS - SIMILAR TO IMPLANT/LAST FUP; ELECTRODE IMPEDANCE "OK"; NO PROGRAMMING CHANGES MADE TO DEVICE PARAMETERS; NORMAL DEVICE FUNCTION  eb   Cardiac Electrophysiology Report      [id number] [facility] 1 11 1u pdf   DEVICE TYPE ICD       Cardiac Electrophysiology Report [id number] 08:56PM [facility]     Test Name Result Flag Reference   Cardiac Electrophysiology Report      [id number]  pdf     Signatures   Electronically signed by : Madeline Martin, ; Jan 12 2017 11:17AM EST                       (Author)    Electronically signed by : TRISH Cai ; Jan 14 2017  9:07PM EST                       (Author)    "

## 2018-03-07 NOTE — PROGRESS NOTES
"  Discussion/Summary  Normal device function      Results/Data  Cardiac Device Remote 11Sep2017 05:04AM Luanne Eubanks     Test Name Result Flag Reference   MISCELLANEOUS COMMENT      LATITUDE TRANSMISSION ( SUBq )  [id number] STATUS "OK" - APPROX  79% BATTERY LONGEVITY AVAILABLE  NO HIGH RATE EPISODES  ALL PARAMETERS APPEAR WITHIN NORMAL LIMITS  APPROPRIATELY FUNCTIONING SUBq ICD  eb   Cardiac Electrophysiology Report      [id number] latitude  9 11 17 pdf   DEVICE TYPE ICD       Cardiac Electrophysiology Report [id number] 05:04AM Luanne Eubanks     Test Name Result Flag Reference   Cardiac Electrophysiology Report      [id number]  pdf     Signatures   Electronically signed by : Rk Crow, ; Sep 11 2017  1:52PM EST                       (Author)    Electronically signed by : TRISH Soria ; Sep 17 2017 12:01PM EST                       (Author)    "

## 2018-03-20 ENCOUNTER — CLINICAL SUPPORT (OUTPATIENT)
Dept: CARDIOLOGY CLINIC | Facility: CLINIC | Age: 48
End: 2018-03-20
Payer: COMMERCIAL

## 2018-03-20 DIAGNOSIS — I42.0 DILATED CARDIOMYOPATHY (HCC): ICD-10-CM

## 2018-03-20 DIAGNOSIS — Z95.810 AICD (AUTOMATIC CARDIOVERTER/DEFIBRILLATOR) PRESENT: ICD-10-CM

## 2018-03-20 DIAGNOSIS — I50.22 CHRONIC SYSTOLIC CONGESTIVE HEART FAILURE (HCC): Primary | ICD-10-CM

## 2018-03-20 PROCEDURE — 93296 REM INTERROG EVL PM/IDS: CPT | Performed by: INTERNAL MEDICINE

## 2018-03-20 PROCEDURE — 93295 DEV INTERROG REMOTE 1/2/MLT: CPT | Performed by: INTERNAL MEDICINE

## 2018-04-25 ENCOUNTER — IN-CLINIC DEVICE VISIT (OUTPATIENT)
Dept: CARDIOLOGY CLINIC | Facility: CLINIC | Age: 48
End: 2018-04-25
Payer: COMMERCIAL

## 2018-04-25 DIAGNOSIS — I50.22 CHRONIC SYSTOLIC CONGESTIVE HEART FAILURE (HCC): ICD-10-CM

## 2018-04-25 DIAGNOSIS — I42.0 DILATED CARDIOMYOPATHY (HCC): Primary | ICD-10-CM

## 2018-04-25 DIAGNOSIS — Z95.810 PRESENCE OF IMPLANTABLE CARDIOVERTER-DEFIBRILLATOR (ICD): ICD-10-CM

## 2018-04-25 PROCEDURE — 93282 PRGRMG EVAL IMPLANTABLE DFB: CPT | Performed by: INTERNAL MEDICINE

## 2018-04-26 NOTE — PROGRESS NOTES
SUB-Q ICD  SQ ICD INTERROGATED IN THE OFFICE BY COMPANY REP  BATTERY STATUS "OK" (72% REMAINING BATTERY LIFE)   ALL TESTING WITHIN NORMAL LIMITS  NO SIGNIFICANT HIGH RATE EPISODES  NORMAL DEVICE FUNCTION   RG

## 2018-05-23 ENCOUNTER — OFFICE VISIT (OUTPATIENT)
Dept: CARDIOLOGY CLINIC | Facility: CLINIC | Age: 48
End: 2018-05-23
Payer: COMMERCIAL

## 2018-05-23 VITALS
DIASTOLIC BLOOD PRESSURE: 76 MMHG | HEART RATE: 72 BPM | SYSTOLIC BLOOD PRESSURE: 124 MMHG | BODY MASS INDEX: 31.13 KG/M2 | OXYGEN SATURATION: 98 % | HEIGHT: 69 IN | WEIGHT: 210.2 LBS

## 2018-05-23 DIAGNOSIS — I10 ESSENTIAL HYPERTENSION: Primary | ICD-10-CM

## 2018-05-23 DIAGNOSIS — Z95.810 CARDIAC DEFIBRILLATOR IN PLACE: ICD-10-CM

## 2018-05-23 DIAGNOSIS — I50.22 CHRONIC SYSTOLIC HEART FAILURE (HCC): ICD-10-CM

## 2018-05-23 DIAGNOSIS — I42.0 DILATED CARDIOMYOPATHY (HCC): ICD-10-CM

## 2018-05-23 PROCEDURE — 99214 OFFICE O/P EST MOD 30 MIN: CPT | Performed by: INTERNAL MEDICINE

## 2018-05-23 PROCEDURE — 93000 ELECTROCARDIOGRAM COMPLETE: CPT | Performed by: INTERNAL MEDICINE

## 2018-05-23 RX ORDER — NEBIVOLOL 5 MG/1
1 TABLET ORAL DAILY
COMMUNITY
Start: 2017-04-10 | End: 2018-05-23

## 2018-05-23 RX ORDER — NICOTINE 21 MG/24HR
1 PATCH, TRANSDERMAL 24 HOURS TRANSDERMAL EVERY 24 HOURS
Qty: 28 PATCH | Refills: 0 | Status: SHIPPED | OUTPATIENT
Start: 2018-05-23

## 2018-05-23 RX ORDER — MAGNESIUM OXIDE 400 MG/1
1 TABLET ORAL 2 TIMES DAILY
COMMUNITY
Start: 2015-08-26

## 2018-05-23 RX ORDER — BISOPROLOL FUMARATE 5 MG/1
5 TABLET ORAL DAILY
Refills: 3 | COMMUNITY
Start: 2018-03-14 | End: 2018-06-11 | Stop reason: SDUPTHER

## 2018-05-23 RX ORDER — POTASSIUM CHLORIDE 750 MG/1
1 CAPSULE, EXTENDED RELEASE ORAL DAILY PRN
COMMUNITY
Start: 2015-11-17

## 2018-05-23 RX ORDER — CETIRIZINE HYDROCHLORIDE 10 MG/1
1 TABLET ORAL DAILY PRN
COMMUNITY

## 2018-05-23 NOTE — PATIENT INSTRUCTIONS
Heart Failure   AMBULATORY CARE:   Heart failure (HF) is a condition that does not allow your heart to fill or pump properly  Not enough oxygen in your blood gets to your organs and tissues  HF can occur in the right side, the left side, or both lower chambers of your heart  HF is often caused by damage or injury to your heart  The damage may be caused by heart attack, other heart conditions, or high blood pressure  HF is a long-term condition that tends to get worse over time  It is important to manage your health to improve your quality of life  HF can be worsened by heavy alcohol use, smoking, diabetes that is not controlled, or obesity  Common signs and symptoms:   · Difficulty breathing with activity that worsens to difficulty breathing at rest    · Shortness of breath while lying flat    · Severe shortness of breath and coughing at night that usually wakes you     · Chest pain at night    · Periods of no breathing, then breathing fast    · Fatigue or lack of energy (often worsened by physical activity)     · Swelling in your ankles, legs, or abdomen    · Fast heartbeat, purple color around your mouth and nailbeds    · Fingers and toes cool to the touch  Call 911 if:   · You have any of the following signs of a heart attack:      ¨ Squeezing, pressure, or pain in your chest that lasts longer than 5 minutes or returns    ¨ Discomfort or pain in your back, neck, jaw, stomach, or arm     ¨ Trouble breathing    ¨ Nausea or vomiting    ¨ Lightheadedness or a sudden cold sweat, especially with chest pain or trouble breathing    Seek care immediately if:   · You gain 3 or more pounds (1 4 kg) in a day, or more than your healthcare provider says you should  · Your heartbeat is fast, slow, or uneven all the time    Contact your healthcare provider if:   · You have symptoms of worsening HF:      ¨ Shortness of breath at rest, at night, or that is getting worse in any way     ¨ Weight gain of 5 or more pounds (2 2 kg) in a week     ¨ More swelling in your legs or ankles     ¨ Abdominal pain or swelling     ¨ More coughing     ¨ Loss of appetite     ¨ Feeling tired all the time    · You feel hopeless or depressed, or you have lost interest in things you used to enjoy  · You often feel worried or afraid  · You have questions or concerns about your condition or care  Treatment for HF  may include any of the following:  · Medicines  may be needed to help regulate your heart rhythm  You may also need medicines to lower your blood pressure, and to get rid of extra fluids  · Oxygen  may help you breathe easier if your oxygen level is lower than normal  A CPAP machine may be used to keep your airway open while you sleep  · Surgery  can be done to implant a pacemaker in your chest to regulate your heart rhythm  Other types of surgery can open blocked heart vessels, replace a damaged heart valve, or remove scar tissue  Manage or prevent HF:   · Do not smoke  Nicotine and other chemicals in cigarettes and cigars can cause lung damage and make HF difficult to manage  Ask your healthcare provider for information if you currently smoke and need help to quit  E-cigarettes or smokeless tobacco still contain nicotine  Talk to your healthcare provider before you use these products  · Do not drink alcohol or take illegal drugs  Alcohol and drugs can worsen your symptoms quickly  · Weigh yourself every morning  Use the same scale, in the same spot  Do this after you use the bathroom, but before you eat or drink anything  Wear the same type of clothing  Do not wear shoes  Record your weight each day so you will notice any sudden weight gain  Swelling and weight gain are signs of fluid retention  If you are overweight, ask how to lose weight safely  · Check your blood pressure and heart rate every day  Ask for more information about how to measure your blood pressure and heart rate correctly   Ask what these numbers should be for you  · Manage any chronic health conditions you have  These include high blood pressure, diabetes, obesity, high cholesterol, metabolic syndrome, and COPD  You will have fewer symptoms if you manage these health conditions  Follow your healthcare provider's recommendations and follow up with him or her regularly  · Eat heart-healthy foods and limit sodium (salt)  An easy way to do this is to eat more fresh fruits and vegetables and fewer canned and processed foods  Replace butter and margarine with heart-healthy oils such as olive oil and canola oil  Other heart-healthy foods include walnuts, whole-grain breads, low-fat dairy products, beans, and lean meats  Fatty fish such as salmon and tuna are also heart healthy  Ask how much salt you can eat each day  Do not use salt substitutes  · Drink liquids as directed  You may need to limit the amount of liquids you drink if you retain fluid  Ask how much liquid to drink each day and which liquids are best for you  · Stay active  If you are not active, your symptoms are likely to worsen quickly  Walking, bicycling, and other types of physical activity help maintain your strength and improve your mood  Physical activity also helps you manage your weight  Work with your healthcare provider to create an exercise plan that is right for you  · Get vaccines as directed  Get a flu shot every year  You may also need the pneumonia vaccine  The flu and pneumonia can be severe for a person who has HF  Vaccines protect you from these infections  Join a support group:  Living with HF can be difficult  It may be helpful to talk with others who have HF  You may learn how to better manage your condition or get emotional support  For more information:   · [address]  [place] , [place]   Phone: [phone]  Web Address: [email or web address]   Follow up with your healthcare provider or cardiologist within 2 weeks or as directed: You may need to return for other tests  You may need home health care  A healthcare provider will monitor your vital signs, weight, and make sure your medicines are working  Write down your questions so you remember to ask them during your visits  © 2017 [employer] Information is for End User's use only and may not be sold, redistributed or otherwise used for commercial purposes  All illustrations and images included in CareNotes® are the copyrighted property of [employer] , [employer]  or Abdelrahman Palomino  The above information is an  only  It is not intended as medical advice for individual conditions or treatments  Talk to your doctor, nurse or pharmacist before following any medical regimen to see if it is safe and effective for you

## 2018-05-23 NOTE — PROGRESS NOTES
Cardiology Follow Up    Melyssa Erazo   1970  [id number]  [address] [facility] [place] [facility] [address]    1  Essential hypertension  POCT ECG   2  Chronic systolic heart failure (HCC)  POCT ECG   3  Dilated cardiomyopathy ([address] )  POCT ECG       Interval History: Patient feels well, no complaints  Patient Active Problem List   Diagnosis    Cardiac defibrillator in place    Tonsillar abscess    Pharyngitis, streptococcal, acute    Tonsillitis, phlegmonous    Oral candidiasis    Chronic systolic heart failure ([address] )    Essential hypertension    Dilated cardiomyopathy (HCC)     Past Medical History:   Diagnosis Date    Cardiac defibrillator in place      Social History     Social History    Marital status: /Civil Union     Spouse name: N/A    Number of children: N/A    Years of education: N/A     Occupational History    Not on file  Social History Main Topics    Smoking status: Current Every Day Smoker     Types: Cigarettes    Smokeless tobacco: Never Used    Alcohol use No    Drug use: No    Sexual activity: Not on file     Other Topics Concern    Not on file     Social History Narrative    No narrative on file      Family History   Problem Relation Age of Onset    Hypertension Maternal Grandmother     Asthma Son     Heart attack Neg Hx     Stroke Neg Hx     Aneurysm Neg Hx     Arrhythmia Neg Hx     Clotting disorder Neg Hx     Hyperlipidemia Neg Hx      History reviewed  No pertinent surgical history      Current Outpatient Prescriptions:     bisoprolol (ZEBETA) 5 mg tablet, Take 5 mg by mouth daily, Disp: , Rfl: 3    cetirizine (ZYRTEC ALLERGY) 10 mg tablet, Take 1 tablet by mouth daily as needed, Disp: , Rfl:     magnesium oxide (MAG-OX) 400 mg tablet, Take 1 tablet by mouth 2 (two) times a day, Disp: , Rfl:     nebivolol (BYSTOLIC) 5 mg tablet, Take 1 tablet by mouth daily, Disp: , Rfl:     potassium chloride (MICRO-K) 10 MEQ CR capsule, Take 1 capsule by mouth daily as needed, Disp: , Rfl:   No Known Allergies    Labs:  No visits with results within 6 Month(s) from this visit  Latest known visit with results is:   Admission on 04/02/2017, Discharged on 04/04/2017   Component Date Value    Blood Culture 04/02/2017 No Growth After 5 Days   Blood Culture 04/02/2017 No Growth After 5 Days       LACTIC ACID 04/02/2017 1 9     WBC 04/02/2017 29 28*    RBC 04/02/2017 4 77     Hemoglobin 04/02/2017 15 3     Hematocrit 04/02/2017 44 9     MCV 04/02/2017 94     MCH 04/02/2017 32 1     MCHC 04/02/2017 34 1     RDW 04/02/2017 16 1*    MPV 04/02/2017 11 2     Platelets 38/17/9275 173     Sodium 04/02/2017 135*    Potassium 04/02/2017 5 1     Chloride 04/02/2017 95*    CO2 04/02/2017 30     Anion Gap 04/02/2017 10     BUN 04/02/2017 22     Creatinine 04/02/2017 1 11     Glucose 04/02/2017 134     Calcium 04/02/2017 9 5     eGFR 04/02/2017 >60 0     Segmented % 04/02/2017 86*    Bands % 04/02/2017 1     Lymphocytes % 04/02/2017 6*    Monocytes % 04/02/2017 6     Eosinophils % 04/02/2017 0     Basophils % 04/02/2017 0     Metamyelocytes% 04/02/2017 1     Absolute Neutrophils 04/02/2017 25 47*    Lymphocytes Absolute 04/02/2017 1 76     Monocytes Absolute 04/02/2017 1 76*    Eosinophils Absolute 04/02/2017 0 00     Basophils Absolute 04/02/2017 0 00     Total Counted 04/02/2017 100     Toxic Granulation 04/02/2017 Present     Toxic Vacuolated Neutrop* 04/02/2017 Present     Anisocytosis 04/02/2017 Present     Platelet Estimate 66/22/6862 Adequate     Sodium 04/03/2017 135*    Potassium 04/03/2017 4 3     Chloride 04/03/2017 99*    CO2 04/03/2017 28     Anion Gap 04/03/2017 8     BUN 04/03/2017 24     Creatinine 04/03/2017 1 17     Glucose 04/03/2017 188*    Glucose, Fasting 04/03/2017 188*    Calcium 04/03/2017 8 7     eGFR 04/03/2017 >60 0     WBC 04/03/2017 25 08*    RBC 04/03/2017 4 16     Hemoglobin 04/03/2017 13 4     Hematocrit 04/03/2017 39 4     MCV 04/03/2017 95     MCH 04/03/2017 32 2     MCHC 04/03/2017 34 0     RDW 04/03/2017 16 0*    Platelets 98/58/1031 167     MPV 04/03/2017 11 4     C difficile toxin by PCR 04/03/2017 NEGATIVE for C difficle toxin by PCR  No results found for: CHOL, TRIG, HDL, LDLDIRECT  Imaging: No results found  Review of Systems:  Review of Systems   Constitutional: Negative for activity change, appetite change, fatigue and fever  HENT: Negative for nosebleeds and sore throat  Eyes: Negative for photophobia and visual disturbance  Respiratory: Negative for cough, chest tightness, shortness of breath and wheezing  Cardiovascular: Negative for chest pain, palpitations and leg swelling  Gastrointestinal: Negative for abdominal pain, diarrhea, nausea and vomiting  Endocrine: Negative for polyuria  Genitourinary: Negative for dysuria, frequency and hematuria  Musculoskeletal: Negative for arthralgias, back pain and gait problem  Skin: Negative for pallor and rash  Neurological: Negative for dizziness, syncope, speech difficulty and light-headedness  Hematological: Does not bruise/bleed easily  Psychiatric/Behavioral: Negative for agitation, behavioral problems and confusion  Physical Exam:  Physical Exam   Constitutional: He is oriented to person, place, and time  He appears well-developed and well-nourished  No distress  HENT:   Head: Normocephalic and atraumatic  Nose: Nose normal    Eyes: EOM are normal  Pupils are equal, round, and reactive to light  No scleral icterus  Neck: Normal range of motion  Neck supple  No JVD present  Cardiovascular: Normal rate, regular rhythm, S1 normal and S2 normal   Exam reveals no gallop, no S3, no distant heart sounds and no friction rub  No murmur heard     No systolic murmur is present   Pulmonary/Chest: Effort normal and breath sounds normal  No respiratory distress  He has no wheezes  He has no rales  Abdominal: Soft  Bowel sounds are normal  He exhibits no distension  Musculoskeletal: He exhibits no edema or deformity  Neurological: He is alert and oriented to person, place, and time  No cranial nerve deficit  Skin: Skin is warm and dry  He is not diaphoretic  No erythema  Psychiatric: He has a normal mood and affect  His behavior is normal    Vitals reviewed  Blood pressure 124/76, pulse 72, height 5' 9" (1 753 m), weight 95 3 kg (210 lb 3 2 oz), SpO2 98 %  EKG:  Normal sinus rhythm with occasional premature ventricular contractions  Voltage criteria for left ventricular hypertrophy  Abnormal ECG    Discussion/Summary:  Chronic systolic CHF with dilated CM and EF 15-20%: weight is stable since last visit, but remains euvolemic on exam  Continue current regimen  s/p LifeVest and now ICD  Considering that he has not been able to take an ACE-I due to reduced renal function and had been taking subtherapeutic doses of coreg, we rechecked echo, which revealed EF of 45%  Now continues on bisoprolol and doing well  HTN: well controlled, continue current regimen  Will check a fasting lipid profile before next visit

## 2018-06-11 DIAGNOSIS — I42.0 DILATED CARDIOMYOPATHY (HCC): Primary | ICD-10-CM

## 2018-06-11 RX ORDER — BISOPROLOL FUMARATE 5 MG/1
TABLET ORAL
Qty: 90 TABLET | Refills: 3 | Status: SHIPPED | OUTPATIENT
Start: 2018-06-11 | End: 2018-08-07 | Stop reason: SDUPTHER

## 2018-08-02 ENCOUNTER — REMOTE DEVICE CLINIC VISIT (OUTPATIENT)
Dept: CARDIOLOGY CLINIC | Facility: CLINIC | Age: 48
End: 2018-08-02
Payer: COMMERCIAL

## 2018-08-02 DIAGNOSIS — Z95.810 PRESENCE OF AUTOMATIC CARDIOVERTER/DEFIBRILLATOR (AICD): Primary | ICD-10-CM

## 2018-08-02 DIAGNOSIS — I42.0 DILATED CARDIOMYOPATHY (HCC): ICD-10-CM

## 2018-08-02 DIAGNOSIS — Z95.810 CARDIAC DEFIBRILLATOR IN PLACE: ICD-10-CM

## 2018-08-02 PROCEDURE — 93295 DEV INTERROG REMOTE 1/2/MLT: CPT

## 2018-08-02 PROCEDURE — 93296 REM INTERROG EVL PM/IDS: CPT

## 2018-08-06 ENCOUNTER — TELEPHONE (OUTPATIENT)
Dept: CARDIOLOGY CLINIC | Facility: CLINIC | Age: 48
End: 2018-08-06

## 2018-08-06 NOTE — TELEPHONE ENCOUNTER
Patient called in needs a refill but did not leave medication name-Have left a message to [facility]

## 2018-08-07 DIAGNOSIS — I42.0 DILATED CARDIOMYOPATHY (HCC): ICD-10-CM

## 2018-08-08 RX ORDER — BISOPROLOL FUMARATE 5 MG/1
5 TABLET ORAL DAILY
Qty: 90 TABLET | Refills: 3 | OUTPATIENT
Start: 2018-08-08 | End: 2019-05-16 | Stop reason: SDUPTHER

## 2018-11-05 ENCOUNTER — REMOTE DEVICE CLINIC VISIT (OUTPATIENT)
Dept: CARDIOLOGY CLINIC | Facility: CLINIC | Age: 48
End: 2018-11-05
Payer: COMMERCIAL

## 2018-11-05 DIAGNOSIS — I50.22 CHRONIC SYSTOLIC CONGESTIVE HEART FAILURE (HCC): Primary | ICD-10-CM

## 2018-11-05 DIAGNOSIS — I42.0 DILATED CARDIOMYOPATHY (HCC): ICD-10-CM

## 2018-11-05 DIAGNOSIS — Z95.810 PRESENCE OF CARDIAC DEFIBRILLATOR: ICD-10-CM

## 2018-11-05 PROCEDURE — 93295 DEV INTERROG REMOTE 1/2/MLT: CPT | Performed by: INTERNAL MEDICINE

## 2018-11-05 PROCEDURE — 93296 REM INTERROG EVL PM/IDS: CPT | Performed by: INTERNAL MEDICINE

## 2018-11-05 NOTE — PROGRESS NOTES
Results for orders placed or performed in visit on 11/05/18   Cardiac EP device report    Narrative    SUB-Q ICD  LATITUDE TRANSMISSION (SUBq)  BATTERY STATUS "OK" (67%)  ALL AVAILABLE ELECTRODE PARAMETERS WITHIN NORMAL LIMITS  NO HIGH RATE EPISODES  NORMAL DEVICE FUNCTION    eb

## 2019-02-14 ENCOUNTER — REMOTE DEVICE CLINIC VISIT (OUTPATIENT)
Dept: CARDIOLOGY CLINIC | Facility: CLINIC | Age: 49
End: 2019-02-14
Payer: COMMERCIAL

## 2019-02-14 DIAGNOSIS — I47.2 VENTRICULAR TACHYCARDIA (HCC): ICD-10-CM

## 2019-02-14 DIAGNOSIS — I50.22 CHRONIC SYSTOLIC CONGESTIVE HEART FAILURE (HCC): ICD-10-CM

## 2019-02-14 DIAGNOSIS — I42.0 DILATED CARDIOMYOPATHY (HCC): Primary | ICD-10-CM

## 2019-02-14 DIAGNOSIS — Z95.810 CARDIAC DEFIBRILLATOR IN PLACE: ICD-10-CM

## 2019-02-14 PROCEDURE — 93295 DEV INTERROG REMOTE 1/2/MLT: CPT | Performed by: INTERNAL MEDICINE

## 2019-02-14 PROCEDURE — 93296 REM INTERROG EVL PM/IDS: CPT | Performed by: INTERNAL MEDICINE

## 2019-02-14 NOTE — PROGRESS NOTES
Results for orders placed or performed in visit on 19   Cardiac EP device report    Narrative    SUB-Q ICD  Sensing Configuration: PrimaryGain SettinXPost Shock Pacing: ON  LATITUDE TRANSMISSION (SUBq)  BATTERY STATUS "OK" (63%)  ALL AVAILABLE ELECTRODE PARAMETERS WITHIN NORMAL LIMITS  NO HIGH RATE EPISODES  NORMAL DEVICE FUNCTION    eb

## 2019-04-25 ENCOUNTER — REMOTE DEVICE CLINIC VISIT (OUTPATIENT)
Dept: CARDIOLOGY CLINIC | Facility: CLINIC | Age: 49
End: 2019-04-25
Payer: COMMERCIAL

## 2019-04-25 DIAGNOSIS — I50.9 CONGESTIVE HEART FAILURE, UNSPECIFIED HF CHRONICITY, UNSPECIFIED HEART FAILURE TYPE (HCC): ICD-10-CM

## 2019-04-25 DIAGNOSIS — I47.2 VENTRICULAR TACHYCARDIA (HCC): ICD-10-CM

## 2019-04-25 DIAGNOSIS — I42.0 DILATED CARDIOMYOPATHY (HCC): Primary | ICD-10-CM

## 2019-04-25 DIAGNOSIS — Z95.810 PRESENCE OF AUTOMATIC CARDIOVERTER/DEFIBRILLATOR (AICD): ICD-10-CM

## 2019-04-25 PROCEDURE — 93295 DEV INTERROG REMOTE 1/2/MLT: CPT | Performed by: INTERNAL MEDICINE

## 2019-04-25 PROCEDURE — 93296 REM INTERROG EVL PM/IDS: CPT | Performed by: INTERNAL MEDICINE

## 2019-05-16 DIAGNOSIS — I42.0 DILATED CARDIOMYOPATHY (HCC): ICD-10-CM

## 2019-05-17 RX ORDER — BISOPROLOL FUMARATE 5 MG/1
5 TABLET ORAL DAILY
Qty: 90 TABLET | Refills: 3 | Status: SHIPPED | OUTPATIENT
Start: 2019-05-17 | End: 2020-05-21

## 2019-07-25 ENCOUNTER — IN-CLINIC DEVICE VISIT (OUTPATIENT)
Dept: CARDIOLOGY CLINIC | Facility: CLINIC | Age: 49
End: 2019-07-25
Payer: COMMERCIAL

## 2019-07-25 DIAGNOSIS — I47.2 VENTRICULAR TACHYCARDIA (HCC): ICD-10-CM

## 2019-07-25 DIAGNOSIS — I42.0 DILATED CARDIOMYOPATHY (HCC): Primary | ICD-10-CM

## 2019-07-25 DIAGNOSIS — Z95.810 CARDIAC DEFIBRILLATOR IN PLACE: ICD-10-CM

## 2019-07-25 PROCEDURE — 93261 INTERROGATE SUBQ DEFIB: CPT | Performed by: INTERNAL MEDICINE

## 2019-07-25 NOTE — PROGRESS NOTES
Results for orders placed or performed in visit on 07/25/19   Cardiac EP device report    Narrative    SUB-Q ICD  DEVICE INTERROGATED IN THE [place] OFFICE (SUBQ)  BATTERY VOLTAGE ADEQUATE (58%)  NO SIGNIFICANT HIGH RATE EPISODES  R WAVES MEASURED IN ALL (3) VECTORS - SIMILAR TO IMPLANT & LAST FUP; ELECTRODE IMPEDANCE "OK"; NO PROGRAMMING CHANGES MADE TO DEVICE PARAMETERS; NORMAL DEVICE FUNCTION     EB

## 2019-08-21 ENCOUNTER — TELEPHONE (OUTPATIENT)
Dept: CARDIOLOGY CLINIC | Facility: CLINIC | Age: 49
End: 2019-08-21

## 2019-08-21 NOTE — TELEPHONE ENCOUNTER
Called patient's PCP to see if he had any labs done recently  PCP states he is not a patient there  Will ask patient when he comes in for his appointment

## 2019-08-28 ENCOUNTER — OFFICE VISIT (OUTPATIENT)
Dept: CARDIOLOGY CLINIC | Facility: CLINIC | Age: 49
End: 2019-08-28
Payer: COMMERCIAL

## 2019-08-28 VITALS
HEIGHT: 70 IN | DIASTOLIC BLOOD PRESSURE: 80 MMHG | SYSTOLIC BLOOD PRESSURE: 118 MMHG | BODY MASS INDEX: 29.89 KG/M2 | HEART RATE: 64 BPM | WEIGHT: 208.8 LBS

## 2019-08-28 DIAGNOSIS — Z95.810 CARDIAC DEFIBRILLATOR IN PLACE: ICD-10-CM

## 2019-08-28 DIAGNOSIS — I42.0 DILATED CARDIOMYOPATHY (HCC): ICD-10-CM

## 2019-08-28 DIAGNOSIS — I50.22 CHRONIC SYSTOLIC HEART FAILURE (HCC): Primary | ICD-10-CM

## 2019-08-28 DIAGNOSIS — I10 ESSENTIAL HYPERTENSION: ICD-10-CM

## 2019-08-28 PROCEDURE — 99214 OFFICE O/P EST MOD 30 MIN: CPT | Performed by: INTERNAL MEDICINE

## 2019-08-28 PROCEDURE — 93000 ELECTROCARDIOGRAM COMPLETE: CPT | Performed by: INTERNAL MEDICINE

## 2019-08-28 NOTE — PROGRESS NOTES
Cardiology Follow Up    Melyssa Day Fortino Dodson   1970  [id number]  [facility] [address] [address]    1  Chronic systolic heart failure (HCC)  POCT ECG   2  Dilated cardiomyopathy ([facility] [address] )     3  Essential hypertension     4  Cardiac defibrillator in place       Chief Complaint   Patient presents with    Follow-up     No Cardiac Complaints       Interval History: Patient feels well, without complaints  No reported chest pain, shortness of breath, palpitations, lightheadedness, syncope, LE edema, orthopnea, PND, or significant weight changes  Patient remains active without any increased fatigue out of the ordinary          Patient Active Problem List   Diagnosis    Cardiac defibrillator in place    Tonsillar abscess    Pharyngitis, streptococcal, acute    Tonsillitis, phlegmonous    Oral candidiasis    Chronic systolic heart failure ([facility] [address] [facility] )    Essential hypertension    Dilated cardiomyopathy (HCC)     Past Medical History:   Diagnosis Date    Cardiac defibrillator in place      Social History     Socioeconomic History    Marital status: /Civil Union     Spouse name: Not on file    Number of children: Not on file    Years of education: Not on file    Highest education level: Not on file   Occupational History    Not on file   Social Needs    Financial resource strain: Not on file    Food insecurity:     Worry: Not on file     Inability: Not on file    Transportation needs:     Medical: Not on file     Non-medical: Not on file   Tobacco Use    Smoking status: Current Every Day Smoker     Types: Cigarettes    Smokeless tobacco: Never Used   Substance and Sexual Activity    Alcohol use: No    Drug use: No    Sexual activity: Not on file   Lifestyle    Physical activity:     Days per week: Not on file     Minutes per session: Not on file    Stress: Not on file   Relationships    Social connections:     Talks on phone: Not on file     Gets together: Not on file     Attends [employer] service: Not on file     Active member of club or organization: Not on file     Attends meetings of clubs or organizations: Not on file     Relationship status: Not on file    Intimate partner violence:     Fear of current or ex partner: Not on file     Emotionally abused: Not on file     Physically abused: Not on file     Forced sexual activity: Not on file   Other Topics Concern    Not on file   Social History Narrative    Not on file      Family History   Problem Relation Age of Onset    Hypertension Maternal Grandmother     Asthma Son     Heart attack Neg Hx     Stroke Neg Hx     Aneurysm Neg Hx     Arrhythmia Neg Hx     Clotting disorder Neg Hx     Hyperlipidemia Neg Hx      History reviewed  No pertinent surgical history  Current Outpatient Medications:     bisoprolol (ZEBETA) 5 mg tablet, Take 1 tablet (5 mg total) by mouth daily, Disp: 90 tablet, Rfl: 3    magnesium oxide (MAG-OX) 400 mg tablet, Take 1 tablet by mouth 2 (two) times a day, Disp: , Rfl:     nicotine (NICODERM CQ) 14 mg/24hr TD 24 hr patch, Place 1 patch on the skin every 24 hours, Disp: 28 patch, Rfl: 0    cetirizine (ZYRTEC ALLERGY) 10 mg tablet, Take 1 tablet by mouth daily as needed, Disp: , Rfl:     potassium chloride (MICRO-K) 10 MEQ CR capsule, Take 1 capsule by mouth daily as needed, Disp: , Rfl:   No Known Allergies    Labs:  No visits with results within 6 Month(s) from this visit  Latest known visit with results is:   Admission on 04/02/2017, Discharged on 04/04/2017   Component Date Value    Blood Culture 04/02/2017 No Growth After 5 Days   Blood Culture 04/02/2017 No Growth After 5 Days       LACTIC ACID 04/02/2017 1 9     WBC 04/02/2017 29 28*    RBC 04/02/2017 4 77     Hemoglobin 04/02/2017 15 3     Hematocrit 04/02/2017 44 9     MCV 04/02/2017 94     MCH 04/02/2017 32 1     MCHC 04/02/2017 34 1     RDW 04/02/2017 16 1*    MPV 04/02/2017 11 2     Platelets 25/69/3544 173     Sodium 04/02/2017 135*    Potassium 04/02/2017 5 1     Chloride 04/02/2017 95*    CO2 04/02/2017 30     ANION GAP 04/02/2017 10     BUN 04/02/2017 22     Creatinine 04/02/2017 1 11     Glucose 04/02/2017 134     Calcium 04/02/2017 9 5     eGFR 04/02/2017 >60 0     Segmented % 04/02/2017 86*    Bands % 04/02/2017 1     Lymphocytes % 04/02/2017 6*    Monocytes % 04/02/2017 6     Eosinophils, % 04/02/2017 0     Basophils % 04/02/2017 0     Metamyelocytes% 04/02/2017 1     Absolute Neutrophils 04/02/2017 25 47*    Lymphocytes Absolute 04/02/2017 1 76     Monocytes Absolute 04/02/2017 1 76*    Eosinophils Absolute 04/02/2017 0 00     Basophils Absolute 04/02/2017 0 00     Total Counted 04/02/2017 100     Toxic Granulation 04/02/2017 Present     Toxic Vacuolated Neutrop* 04/02/2017 Present     Anisocytosis 04/02/2017 Present     Platelet Estimate 58/92/2238 Adequate     Sodium 04/03/2017 135*    Potassium 04/03/2017 4 3     Chloride 04/03/2017 99*    CO2 04/03/2017 28     ANION GAP 04/03/2017 8     BUN 04/03/2017 24     Creatinine 04/03/2017 1 17     Glucose 04/03/2017 188*    Glucose, Fasting 04/03/2017 188*    Calcium 04/03/2017 8 7     eGFR 04/03/2017 >60 0     WBC 04/03/2017 25 08*    RBC 04/03/2017 4 16     Hemoglobin 04/03/2017 13 4     Hematocrit 04/03/2017 39 4     MCV 04/03/2017 95     MCH 04/03/2017 32 2     MCHC 04/03/2017 34 0     RDW 04/03/2017 16 0*    Platelets 58/18/0035 167     MPV 04/03/2017 11 4     C difficile toxin by PCR 04/03/2017 NEGATIVE for C difficle toxin by PCR  No results found for: CHOL, TRIG, HDL, LDLDIRECT  Imaging: No results found  Review of Systems:  Review of Systems   Constitutional: Negative for activity change, appetite change, fatigue and fever  HENT: Negative for nosebleeds and sore throat      Eyes: Negative for photophobia and visual disturbance  Respiratory: Negative for cough, chest tightness, shortness of breath and wheezing  Cardiovascular: Negative for chest pain, palpitations and leg swelling  Gastrointestinal: Negative for abdominal pain, diarrhea, nausea and vomiting  Endocrine: Negative for polyuria  Genitourinary: Negative for dysuria, frequency and hematuria  Musculoskeletal: Negative for arthralgias, back pain and gait problem  Skin: Negative for pallor and rash  Neurological: Negative for dizziness, syncope, speech difficulty and light-headedness  Hematological: Does not bruise/bleed easily  Psychiatric/Behavioral: Negative for agitation, behavioral problems and confusion  Physical Exam:  Physical Exam   Constitutional: He is oriented to person, place, and time  He appears well-developed and well-nourished  HENT:   Head: Normocephalic and atraumatic  Nose: Nose normal    Eyes: Pupils are equal, round, and reactive to light  EOM are normal  No scleral icterus  Neck: Normal range of motion  Neck supple  No JVD present  Cardiovascular: Normal rate, regular rhythm and normal heart sounds  Exam reveals no gallop and no friction rub  No murmur heard  Pulmonary/Chest: Effort normal and breath sounds normal  No respiratory distress  He has no wheezes  He has no rales  Abdominal: Soft  Bowel sounds are normal  He exhibits no distension  There is no tenderness  Musculoskeletal: Normal range of motion  He exhibits no edema or deformity  Neurological: He is alert and oriented to person, place, and time  No cranial nerve deficit  Skin: Skin is warm and dry  No rash noted  He is not diaphoretic  Psychiatric: He has a normal mood and affect  His behavior is normal    Vitals reviewed  Blood pressure 118/80, pulse 64, height 5' 9 5" (1 765 m), weight 94 7 kg (208 lb 12 8 oz)      EKG:  Normal sinus rhythm  Voltage criteria for left ventricular hypertrophy  Nonspecific T wave abnormality  Abnormal ECG    Discussion/Summary:  Chronic systolic CHF with dilated CM and EF 15-20%: weight is stable since last visit, and remains euvolemic on exam  Continue current regimen  s/p LifeVest and now ICD  Considering that he has not been able to take an ACE-I due to reduced renal function and had been taking subtherapeutic doses of coreg, we rechecked echo, which revealed EF of 45%  Now continues on bisoprolol and doing well  HTN: well controlled, continue current regimen  Will check a fasting lipid profile before next visit

## 2019-08-28 NOTE — PATIENT INSTRUCTIONS
Heart Failure   AMBULATORY CARE:   Heart failure (HF) is a condition that does not allow your heart to fill or pump properly  Not enough oxygen in your blood gets to your organs and tissues  HF can occur in the right side, the left side, or both lower chambers of your heart  HF is often caused by damage or injury to your heart  The damage may be caused by heart attack, other heart conditions, or high blood pressure  HF is a long-term condition that tends to get worse over time  It is important to manage your health to improve your quality of life  HF can be worsened by heavy alcohol use, smoking, diabetes that is not controlled, or obesity  Common signs and symptoms:   · Difficulty breathing with activity that worsens to difficulty breathing at rest    · Shortness of breath while lying flat    · Severe shortness of breath and coughing at night that usually wakes you     · Chest pain at night    · Periods of no breathing, then breathing fast    · Fatigue or lack of energy (often worsened by physical activity)     · Swelling in your ankles, legs, or abdomen    · Fast heartbeat, purple color around your mouth and nailbeds    · Fingers and toes cool to the touch  Call 911 if:   · You have any of the following signs of a heart attack:      ¨ Squeezing, pressure, or pain in your chest that lasts longer than 5 minutes or returns    ¨ Discomfort or pain in your back, neck, jaw, stomach, or arm     ¨ Trouble breathing    ¨ Nausea or vomiting    ¨ Lightheadedness or a sudden cold sweat, especially with chest pain or trouble breathing    Seek care immediately if:   · You gain 3 or more pounds (1 4 kg) in a day, or more than your healthcare provider says you should  · Your heartbeat is fast, slow, or uneven all the time    Contact your healthcare provider if:   · You have symptoms of worsening HF:      ¨ Shortness of breath at rest, at night, or that is getting worse in any way     ¨ Weight gain of 5 or more pounds (2 2 kg) in a week     ¨ More swelling in your legs or ankles     ¨ Abdominal pain or swelling     ¨ More coughing     ¨ Loss of appetite     ¨ Feeling tired all the time    · You feel hopeless or depressed, or you have lost interest in things you used to enjoy  · You often feel worried or afraid  · You have questions or concerns about your condition or care  Treatment for HF  may include any of the following:  · Medicines  may be needed to help regulate your heart rhythm  You may also need medicines to lower your blood pressure, and to get rid of extra fluids  · Oxygen  may help you breathe easier if your oxygen level is lower than normal  A CPAP machine may be used to keep your airway open while you sleep  · Surgery  can be done to implant a pacemaker in your chest to regulate your heart rhythm  Other types of surgery can open blocked heart vessels, replace a damaged heart valve, or remove scar tissue  Manage or prevent HF:   · Do not smoke  Nicotine and other chemicals in cigarettes and cigars can cause lung damage and make HF difficult to manage  Ask your healthcare provider for information if you currently smoke and need help to quit  E-cigarettes or smokeless tobacco still contain nicotine  Talk to your healthcare provider before you use these products  · Do not drink alcohol or take illegal drugs  Alcohol and drugs can worsen your symptoms quickly  · Weigh yourself every morning  Use the same scale, in the same spot  Do this after you use the bathroom, but before you eat or drink anything  Wear the same type of clothing  Do not wear shoes  Record your weight each day so you will notice any sudden weight gain  Swelling and weight gain are signs of fluid retention  If you are overweight, ask how to lose weight safely  · Check your blood pressure and heart rate every day  Ask for more information about how to measure your blood pressure and heart rate correctly   Ask what these numbers should be for you  · Manage any chronic health conditions you have  These include high blood pressure, diabetes, obesity, high cholesterol, metabolic syndrome, and COPD  You will have fewer symptoms if you manage these health conditions  Follow your healthcare provider's recommendations and follow up with him or her regularly  · Eat heart-healthy foods and limit sodium (salt)  An easy way to do this is to eat more fresh fruits and vegetables and fewer canned and processed foods  Replace butter and margarine with heart-healthy oils such as olive oil and canola oil  Other heart-healthy foods include walnuts, whole-grain breads, low-fat dairy products, beans, and lean meats  Fatty fish such as salmon and tuna are also heart healthy  Ask how much salt you can eat each day  Do not use salt substitutes  · Drink liquids as directed  You may need to limit the amount of liquids you drink if you retain fluid  Ask how much liquid to drink each day and which liquids are best for you  · Stay active  If you are not active, your symptoms are likely to worsen quickly  Walking, bicycling, and other types of physical activity help maintain your strength and improve your mood  Physical activity also helps you manage your weight  Work with your healthcare provider to create an exercise plan that is right for you  · Get vaccines as directed  Get a flu shot every year  You may also need the pneumonia vaccine  The flu and pneumonia can be severe for a person who has HF  Vaccines protect you from these infections  Join a support group:  Living with HF can be difficult  It may be helpful to talk with others who have HF  You may learn how to better manage your condition or get emotional support  For more information:   · [address]  [place] , [place]   Phone: [phone]  Web Address: [email or web address]  org   Follow up with your healthcare provider or cardiologist within 2 weeks or as directed: You may need to return for other tests  You may need home health care  A healthcare provider will monitor your vital signs, weight, and make sure your medicines are working  Write down your questions so you remember to ask them during your visits  © 2017 [employer] Information is for End User's use only and may not be sold, redistributed or otherwise used for commercial purposes  All illustrations and images included in CareNotes® are the copyrighted property of [employer]  or Abdelrahman Palmoino  The above information is an  only  It is not intended as medical advice for individual conditions or treatments  Talk to your doctor, nurse or pharmacist before following any medical regimen to see if it is safe and effective for you

## 2019-10-24 ENCOUNTER — OFFICE VISIT (OUTPATIENT)
Dept: INTERNAL MEDICINE CLINIC | Facility: CLINIC | Age: 49
End: 2019-10-24
Payer: COMMERCIAL

## 2019-10-24 VITALS
DIASTOLIC BLOOD PRESSURE: 60 MMHG | WEIGHT: 202.6 LBS | RESPIRATION RATE: 20 BRPM | HEART RATE: 60 BPM | SYSTOLIC BLOOD PRESSURE: 110 MMHG | BODY MASS INDEX: 30.01 KG/M2 | TEMPERATURE: 98.6 F | HEIGHT: 69 IN | OXYGEN SATURATION: 100 %

## 2019-10-24 DIAGNOSIS — I42.0 DILATED CARDIOMYOPATHY (HCC): ICD-10-CM

## 2019-10-24 DIAGNOSIS — K58.9 IBS (IRRITABLE BOWEL SYNDROME): Primary | ICD-10-CM

## 2019-10-24 DIAGNOSIS — I10 ESSENTIAL HYPERTENSION: ICD-10-CM

## 2019-10-24 DIAGNOSIS — I50.22 CHRONIC SYSTOLIC HEART FAILURE (HCC): ICD-10-CM

## 2019-10-24 PROBLEM — K21.9 GERD (GASTROESOPHAGEAL REFLUX DISEASE): Status: ACTIVE | Noted: 2019-10-24

## 2019-10-24 PROCEDURE — 3008F BODY MASS INDEX DOCD: CPT | Performed by: HOSPITALIST

## 2019-10-24 PROCEDURE — 3078F DIAST BP <80 MM HG: CPT | Performed by: HOSPITALIST

## 2019-10-24 PROCEDURE — 99204 OFFICE O/P NEW MOD 45 MIN: CPT | Performed by: HOSPITALIST

## 2019-10-24 PROCEDURE — 3074F SYST BP LT 130 MM HG: CPT | Performed by: HOSPITALIST

## 2019-10-24 RX ORDER — DICYCLOMINE HYDROCHLORIDE 10 MG/1
10 CAPSULE ORAL
Qty: 90 CAPSULE | Refills: 0 | Status: SHIPPED | OUTPATIENT
Start: 2019-10-24 | End: 2020-01-02

## 2019-10-24 RX ORDER — FLAVORING AGENT
OIL (ML) MISCELLANEOUS
Qty: 1 BOTTLE | Refills: 0 | Status: SHIPPED | OUTPATIENT
Start: 2019-10-24

## 2019-10-24 NOTE — ASSESSMENT & PLAN NOTE
Prior EF on echo noted to be around 15-20%  Echocardiogram was read checked during last Cardiology visit and it was noted that EF was increased to 45%    ·  Continue follow-up with cardiologist as outpatient  ·  Continue home regimen

## 2019-10-24 NOTE — PROGRESS NOTES
INTERNAL MEDICINE HEALTH MAINTENANCE OFFICE VISIT  [facility] - [facility] INTERNAL MEDICINE [place]    NAME: Melyssa Pina Jr  AGE: 51 y o  SEX: male  : 1970     DATE: 10/24/2019     Assessment and Plan:     Problem List Items Addressed This Visit        Digestive    IBS (irritable bowel syndrome) - Primary     Patient reports history of on and off diarrhea for about 7 years  Episodes usually occur 3 to 4 times a day  Change in bowel habits not particularly related with any food intake  Patient denies blood in the stool  Patient also reports he has been "more gassy" for the last 2 months  Patient also reports nausea and vomiting  Patient reports a sour taste in the mouth  Patient has used Tums and reports partial relief of symptoms  · Start bentyl 10 mg t i d   · Peppermint oil 2 drops t i d   · Printed out a list of foods that should be avoided, handout given to patient  · Advised patient to call in 2 weeks if there is no relief of symptoms  Relevant Medications    dicyclomine (BENTYL) 10 mg capsule    Flavoring Agent (PEPPERMINT FLAVOR) OIL    Other Relevant Orders    Comprehensive metabolic panel       Cardiovascular and Mediastinum    Chronic systolic heart failure (HCC)     Wt Readings from Last 3 Encounters:   10/24/19 91 9 kg (202 lb 9 6 oz)   19 94 7 kg (208 lb 12 8 oz)   18 95 3 kg (210 lb 3 2 oz)     Patient regularly sees Cardiologist as outpatient, was last seen by Dr Donny Santos in 2019  Patient denies any chest pain, shortness of breath, leg swelling, weight changes  · Prior EF 15-20%,  recent echowe rechecked  with noted improvement of EF of 45%  · Continue current regimen  · Continue follow-up with the Cardiologist             [facility]     Prior EF on echo noted to be around 15-20%  Echocardiogram was read checked during last Cardiology visit and it was noted that EF was increased to 45%    ·  Continue follow-up with cardiologist as outpatient  ·  Continue home regimen         Essential hypertension      Patient denies any chest pain, shortness of breath, headache, dizziness/lightheadedness  Blood pressure noted to be stable  Continue home medications  1  Patient Counseling:  · Nutrition: Stressed importance of moderation in sodium/caffeine intake, saturated fat, trans fat and cholesterol  Discussed portion control as well as increasing intake of fruits, vegetables, lean protein, and fish  · Exercise: Stressed the importance of regular exercise at least 150 mins/week  · Sexuality: Discussed sexually transmitted diseases, partner selection, use of condoms, avoidance of unintended pregnancy  and contraceptive alternatives  · Injury prevention: Discussed safety belts, safety helmets, smoke detector, smoking near bedding or upholstery  · Dental health: Discussed importance of regular tooth brushing, flossing, and dental visits  · Immunizations reviewed  Patient was offered immunizations today however patient refused for now  Advised patient to call the office if he changes his mind and if he wants to get immunizations done  Patient amenable to plan  · Education was printed for patient regarding food to avoid to help with IBS  2  Discussed the patient's BMI with him  BMI Counseling: Body mass index is 29 7 kg/m²  The BMI is above normal  Nutrition recommendations include reducing portion sizes, decreasing overall calorie intake, 3-5 servings of fruits/vegetables daily, reducing fast food intake, consuming healthier snacks, decreasing soda and/or juice intake, moderation in carbohydrate intake, increasing intake of lean protein, reducing intake of saturated fat and trans fat and reducing intake of cholesterol  Exercise recommendations include moderate aerobic physical activity for 150 minutes/week and exercising 3-5 times per week  Return in about 6 months (around 4/24/2020)       Chief Complaint:     No chief complaint on file  History of Present Illness: Well Adult Physical   Patient here for a comprehensive physical exam   The patient presents to the office to establish care  Patient regularly sees his cardiologist for follow-up of cardiomyopathy  Patient comes in today with reports of history of on and off diarrhea for about 7 years now  Patient also noted that episodes usually occur 3 to 4 times, not particularly related with any food intake  Patient denies any blood in the stool  Patient is not taking any medications for the diarrhea  For the past 2 months however patient noted that he has been having increased abdominal pain mostly in the epigastric area  Patient also noted that he feels "more gassy" and bloated  Patient also reports nausea and vomiting  Patient endorses that 2 months ago his work schedule has changed and that he has been mostly working nights  Patient also said that the earliest that he can eat is around 3 or 4:00 a m  He also endorses that he usually skips meals  Patient tried to use Tums which only afforded partial relief  Diet and Physical Activity  Diet:   Mostly consumes fatty food, carbohydrates, chocolates  Patient also prefers eating spicy food  Weight concerns: Patient is overweight (BMI 25 0-29  9)  Exercise:  patient reported that he usually active for around 7 hours when he is at work  Patient claims that he lifts heavy stuff and is constantly on his feet  Depression Screen  Negative for depression with PHQ2 score of 1       General Health  Hearing: Normal:  bilateral  Vision: goes for regular eye exams  Dental: regular dental visits      The following portions of the patient's history were reviewed and updated as appropriate: allergies, current medications, past family history, past medical history, past social history, past surgical history and problem list      Review of Systems:     Review of Systems   Constitutional: Negative for chills, diaphoresis, fatigue and fever  HENT: Negative for ear discharge, postnasal drip, tinnitus and trouble swallowing  Eyes: Negative for pain and visual disturbance  Respiratory: Negative for cough, chest tightness and shortness of breath  Cardiovascular: Negative for chest pain, palpitations and leg swelling  Gastrointestinal: Positive for abdominal pain, diarrhea, nausea and vomiting  Negative for blood in stool  Genitourinary: Negative for dysuria and hematuria  Musculoskeletal: Negative for arthralgias, back pain and joint swelling  Skin: Negative for color change, pallor and rash  Neurological: Negative for dizziness, light-headedness and numbness  Hematological: Negative for adenopathy  Psychiatric/Behavioral: Negative for confusion and hallucinations  Past Medical History:     Past Medical History:   Diagnosis Date    Cardiac defibrillator in place         Past Surgical History:     No past surgical history on file  Social History:     Social History     Socioeconomic History    Marital status: /Civil Union     Spouse name: Not on file    Number of children: 4    Years of education: Not on file    Highest education level: Bachelor's degree (e g , BA, AB, BS)   Occupational History    Not on file   Social Needs    Financial resource strain: Not hard at all   Junko Tada insecurity:     Worry: Never true     Inability: Never true   Interviu Me needs:     Medical: Yes     Non-medical: Yes   Tobacco Use    Smoking status: Current Some Day Smoker     Types: Cigarettes    Smokeless tobacco: Never Used   Substance and Sexual Activity    Alcohol use: Yes     Alcohol/week: 2 0 standard drinks     Types: 2 Shots of liquor per week     Frequency: 2-3 times a week     Comment: bourbon or vodka    Drug use: No    Sexual activity: Yes     Partners: Female   Lifestyle    Physical activity:     Days per week: 0 days     Minutes per session: 0 min    Stress:  To some extent Relationships    Social connections:     Talks on phone: Never     Gets together: Never     Attends Muslim service: Never     Active member of club or organization: No     Attends meetings of clubs or organizations: Never     Relationship status:     Intimate partner violence:     Fear of current or ex partner: No     Emotionally abused: No     Physically abused: No     Forced sexual activity: No   Other Topics Concern    Not on file   Social History Narrative    Not on file        Family History:     Family History   Problem Relation Age of Onset    Hypertension Maternal Grandmother     Asthma Son     Heart attack Neg Hx     Stroke Neg Hx     Aneurysm Neg Hx     Arrhythmia Neg Hx     Clotting disorder Neg Hx     Hyperlipidemia Neg Hx         Current Medications:     Outpatient Medications Prior to Visit   Medication Sig Dispense Refill    bisoprolol (ZEBETA) 5 mg tablet Take 1 tablet (5 mg total) by mouth daily 90 tablet 3    magnesium oxide (MAG-OX) 400 mg tablet Take 1 tablet by mouth 2 (two) times a day      nicotine (NICODERM CQ) 14 mg/24hr TD 24 hr patch Place 1 patch on the skin every 24 hours 28 patch 0    potassium chloride (MICRO-K) 10 MEQ CR capsule Take 1 capsule by mouth daily as needed      cetirizine (ZYRTEC ALLERGY) 10 mg tablet Take 1 tablet by mouth daily as needed       No facility-administered medications prior to visit  Allergies:     No Known Allergies     Objective:     Physical Exam:  Physical Exam   Constitutional: He is oriented to person, place, and time  He appears well-developed and well-nourished  HENT:   Head: Normocephalic and atraumatic  Mouth/Throat: Oropharynx is clear and moist  No oropharyngeal exudate  Eyes: Pupils are equal, round, and reactive to light  Conjunctivae and EOM are normal  No scleral icterus  Neck: Normal range of motion  Neck supple  No JVD present     Cardiovascular: Normal rate, regular rhythm, normal heart sounds and intact distal pulses  No murmur heard  Pulmonary/Chest: Effort normal and breath sounds normal  No respiratory distress  Abdominal: Soft  Bowel sounds are normal  There is tenderness ( diffuse)  There is no guarding  Musculoskeletal: Normal range of motion  He exhibits no edema, tenderness or deformity  Lymphadenopathy:     He has no cervical adenopathy  Neurological: He is alert and oriented to person, place, and time  No cranial nerve deficit or sensory deficit  He exhibits normal muscle tone  Skin: Skin is warm  He is not diaphoretic  No erythema  No pallor  Psychiatric: He has a normal mood and affect  His behavior is normal  Thought content normal    Nursing note and vitals reviewed  Data:    Laboratory Results: I have personally reviewed the pertinent laboratory results/reports   Radiology/Other Diagnostic Testing Results: I have personally reviewed pertinent films in PACS     Health Maintenance:     Health Maintenance   Topic Date Due    BMI: Followup Plan  07/17/1989    INFLUENZA VACCINE  11/20/2019 (Originally 7/1/2019)    Pneumococcal Vaccine: Pediatrics (0 to 5 Years) and At-Risk Patients (6 to 59 Years) (1 of 1 - PPSV23) 10/24/2020 (Originally 7/17/1977)    DTaP,Tdap,and Td Vaccines (1 - Tdap) 10/24/2020 (Originally 7/17/1992)    Depression Screening PHQ  10/24/2020    BMI: Adult  10/24/2020    Pneumococcal Vaccine: 65+ Years (1 of 2 - PCV13) 07/17/2036    Hepatitis C Screening  Completed    HEPATITIS B VACCINES  Aged Out       There is no immunization history on file for this patient      Renata Ruiz MD  [facility]

## 2019-10-24 NOTE — PATIENT INSTRUCTIONS
Irritable Bowel Syndrome   GENERAL INFORMATION:   What is irritable bowel syndrome? Irritable bowel syndrome (IBS) is a condition that prevents food from moving through your intestines normally  The food may move through too slowly or too quickly  This causes bloating, increased gas, constipation, or diarrhea  What causes IBS? The cause of IBS is not known  The following may trigger IBS symptoms:  · Certain foods and liquids: Some examples include wheat, rye, milk, alcohol, coffee, and artificial sweeteners  · Stress: Symptoms may start or worsen with stressful events, such as travel or problems at home or work  · Medical conditions: Nerves that control the feeling and movement of the intestines may cause IBS symptoms  Celiac disease and infectious diarrhea may also trigger IBS symptoms  · Hormonal changes: IBS symptoms may be triggered during a woman's monthly period  What are the signs and symptoms of IBS? Signs and symptoms of IBS may come and go  Symptoms can occur a few times a week to once a month  IBS can go away for years and suddenly return  Your symptoms may worsen after you eat a big meal or if you do not eat enough healthy foods  · Abdominal pain that disappears after you have a bowel movement    · Abdominal camps that are worse after you eat    · Gas    · Bloated abdomen    · Diarrhea, constipation, or both     · Feeling like you need to have a bowel movement after you just had one  How is IBS diagnosed? Your healthcare provider will ask about your symptoms and when they started   He will ask what triggers your symptoms, and how long they last  He may be able to diagnose IBS if you have some of the following at least 12 weeks within 1 year:  · Abdominal pain or discomfort relieved with bowel movements    · Change in the form of your bowel movements, to diarrhea or constipation    · Change in how often you have a bowel movement    · Mucus in your bowel movement    · Constipation    · Diarrhea    · Feeling bloated or swollen in your abdomen    · Straining during a bowel movement    · Feeling like you need to have a bowel movement after you just had one    · Feeling that you have not completely emptied your bowels after a bowel movement  What tests are done to diagnose IBS? · Blood tests: You may need blood taken to give caregivers information about how your body is working  The blood may be taken from your hand, arm, or IV  · Bowel movement sample: A sample of your bowel movement is sent to a lab for tests  The test may show what germ is causing your illness  · CT scan: This is also called a CAT scan  An x-ray machine uses a computer to take pictures of your abdomen  Healthcare providers check for problems and abnormal changes  You may be given dye in your IV to help your healthcare providers see the images better  Tell the healthcare provider if you are allergic to shellfish or iodine  You may also be allergic to the dye  · Colonoscopy or sigmoidoscopy: A tube with a light on the end will be put into your anus, and then moved forward into your intestine  A sigmoidoscopy looks at the lower part of your intestine  A colonoscopy looks at your entire intestine  · Lactose intolerance test: Lactase is an enzyme you need to digest the lactose (sugar) found in dairy products  If you lack this enzyme, you may have problems similar to those caused by IBS  Your healthcare provider may give you a breath test to measure carbohydrates or ask you to exclude milk products for several weeks  How is IBS treated? There is no cure for IBS  The goal of treatment is to decrease your signs and symptoms  · Diarrhea medicine: This medicine is given to decrease the amount of diarrhea you are having  Some of these medicines coat the intestine and make bowel movements less watery  Other medicines work by slowing down how fast the intestines move food through       · Muscle relaxers: This medicine decreases abdominal pain and muscle spasms  · Laxatives: This medicine helps treat constipation by moving food and liquids out of your stomach faster  · Stool softeners: This medicine softens bowel movements to prevent straining  How can I manage my IBS? · Keep a diary: Keep a diary of everything you eat and drink, and your symptoms, for 3 weeks  · Eat a variety of healthy foods: Healthy foods include fruits, vegetables, whole-grain breads, low-fat dairy products, beans, lean meat, and fish  You may need to eat or avoid certain foods to decrease your symptoms  · Drink liquids as directed: For most people, good liquids to drink are water, juice, and milk  Ask which liquids are best for you and how much liquid to drink each day  · Exercise as directed: Talk to your healthcare provider about the best exercise plan for you  Exercise can decrease your blood pressure and improve your health  · Manage stress: Stress may slow healing and cause illness  Learn new ways to relax, such as deep breathing  What are the risks of IBS? Without treatment, IBS can interfere with work, personal relationships, and your daily activities  At times, you may feel discouraged or depressed  You can develop hemorrhoids if you strain during a bowel movement  Severe diarrhea can cause dehydration, which may be life-threatening  When should I contact my healthcare provider? · You have a fever  · You have pain in your rectum  · Your abdominal pain does not go away, even after treatment  · You have questions or concerns about your condition or care  When should I seek immediate care or call 911? · You have severe abdominal pain  · Your bowel movements are dark or have blood in them  CARE AGREEMENT:   You have the right to help plan your care  Learn about your health condition and how it may be treated   Discuss treatment options with your caregivers to decide what care you want to receive  You always have the right to refuse treatment  The above information is an  only  It is not intended as medical advice for individual conditions or treatments  Talk to your doctor, nurse or pharmacist before following any medical regimen to see if it is safe and effective for you  © 2014 [address] is for End User's use only and may not be sold, redistributed or otherwise used for commercial purposes  All illustrations and images included in CareNotes® are the copyrighted property of [employer]  or Abdelrahman Palomino

## 2019-10-24 NOTE — ASSESSMENT & PLAN NOTE
Patient reports history of on and off diarrhea for about 7 years  Episodes usually occur 3 to 4 times a day  Change in bowel habits not particularly related with any food intake  Patient denies blood in the stool  Patient also reports he has been "more gassy" for the last 2 months  Patient also reports nausea and vomiting  Patient reports a sour taste in the mouth  Patient has used Tums and reports partial relief of symptoms  · Start bentyl 10 mg t i d   · Peppermint oil 2 drops t i d   · Printed out a list of foods that should be avoided, handout given to patient  · Advised patient to call in 2 weeks if there is no relief of symptoms

## 2019-10-24 NOTE — ASSESSMENT & PLAN NOTE
Wt Readings from Last 3 Encounters:   10/24/19 91 9 kg (202 lb 9 6 oz)   08/28/19 94 7 kg (208 lb 12 8 oz)   05/23/18 95 3 kg (210 lb 3 2 oz)     Patient regularly sees Cardiologist as outpatient, was last seen by Dr Danette Zuleta in 8/2019  Patient denies any chest pain, shortness of breath, leg swelling, weight changes    · Prior EF 15-20%,  recent echowe rechecked  with noted improvement of EF of 45%  · Continue current regimen  · Continue follow-up with the Cardiologist

## 2019-10-24 NOTE — ASSESSMENT & PLAN NOTE
Patient denies any chest pain, shortness of breath, headache, dizziness/lightheadedness  Blood pressure noted to be stable  Continue home medications

## 2019-10-26 ENCOUNTER — HOSPITAL ENCOUNTER (EMERGENCY)
Facility: HOSPITAL | Age: 49
Discharge: HOME | End: 2019-10-27
Attending: EMERGENCY MEDICINE
Payer: COMMERCIAL

## 2019-10-26 DIAGNOSIS — R11.2 NAUSEA & VOMITING: Primary | ICD-10-CM

## 2019-10-26 DIAGNOSIS — R10.13 EPIGASTRIC ABDOMINAL PAIN: ICD-10-CM

## 2019-10-26 PROCEDURE — 99284 EMERGENCY DEPT VISIT MOD MDM: CPT

## 2019-10-27 ENCOUNTER — APPOINTMENT (EMERGENCY)
Dept: RADIOLOGY | Facility: HOSPITAL | Age: 49
End: 2019-10-27
Payer: COMMERCIAL

## 2019-10-27 VITALS
BODY MASS INDEX: 29.76 KG/M2 | WEIGHT: 203 LBS | OXYGEN SATURATION: 100 % | DIASTOLIC BLOOD PRESSURE: 63 MMHG | SYSTOLIC BLOOD PRESSURE: 142 MMHG | HEART RATE: 55 BPM | TEMPERATURE: 98.4 F | RESPIRATION RATE: 16 BRPM

## 2019-10-27 LAB
ALBUMIN SERPL BCP-MCNC: 2.3 G/DL (ref 3.5–5)
ALP SERPL-CCNC: 175 U/L (ref 46–116)
ALT SERPL W P-5'-P-CCNC: 22 U/L (ref 12–78)
ANION GAP SERPL CALCULATED.3IONS-SCNC: 7 MMOL/L (ref 4–13)
AST SERPL W P-5'-P-CCNC: 53 U/L (ref 5–45)
ATRIAL RATE: 56 BPM
BASOPHILS # BLD AUTO: 0.07 THOUSANDS/ΜL (ref 0–0.1)
BASOPHILS NFR BLD AUTO: 1 % (ref 0–1)
BILIRUB SERPL-MCNC: 1.1 MG/DL (ref 0.2–1)
BUN SERPL-MCNC: 10 MG/DL (ref 5–25)
CALCIUM SERPL-MCNC: 8.7 MG/DL (ref 8.3–10.1)
CHLORIDE SERPL-SCNC: 101 MMOL/L (ref 100–108)
CO2 SERPL-SCNC: 27 MMOL/L (ref 21–32)
CREAT SERPL-MCNC: 0.98 MG/DL (ref 0.6–1.3)
EOSINOPHIL # BLD AUTO: 0.09 THOUSAND/ΜL (ref 0–0.61)
EOSINOPHIL NFR BLD AUTO: 1 % (ref 0–6)
ERYTHROCYTE [DISTWIDTH] IN BLOOD BY AUTOMATED COUNT: 13.7 % (ref 11.6–15.1)
GFR SERPL CREATININE-BSD FRML MDRD: 105 ML/MIN/1.73SQ M
GLUCOSE SERPL-MCNC: 110 MG/DL (ref 65–140)
HCT VFR BLD AUTO: 36.1 % (ref 36.5–49.3)
HGB BLD-MCNC: 12.1 G/DL (ref 12–17)
IMM GRANULOCYTES # BLD AUTO: 0.05 THOUSAND/UL (ref 0–0.2)
IMM GRANULOCYTES NFR BLD AUTO: 1 % (ref 0–2)
LIPASE SERPL-CCNC: 476 U/L (ref 73–393)
LYMPHOCYTES # BLD AUTO: 1.93 THOUSANDS/ΜL (ref 0.6–4.47)
LYMPHOCYTES NFR BLD AUTO: 20 % (ref 14–44)
MCH RBC QN AUTO: 31.5 PG (ref 26.8–34.3)
MCHC RBC AUTO-ENTMCNC: 33.5 G/DL (ref 31.4–37.4)
MCV RBC AUTO: 94 FL (ref 82–98)
MONOCYTES # BLD AUTO: 1.09 THOUSAND/ΜL (ref 0.17–1.22)
MONOCYTES NFR BLD AUTO: 11 % (ref 4–12)
NEUTROPHILS # BLD AUTO: 6.59 THOUSANDS/ΜL (ref 1.85–7.62)
NEUTS SEG NFR BLD AUTO: 66 % (ref 43–75)
NRBC BLD AUTO-RTO: 0 /100 WBCS
P AXIS: 57 DEGREES
PLATELET # BLD AUTO: 170 THOUSANDS/UL (ref 149–390)
PMV BLD AUTO: 9.3 FL (ref 8.9–12.7)
POTASSIUM SERPL-SCNC: 4.5 MMOL/L (ref 3.5–5.3)
PR INTERVAL: 158 MS
PROT SERPL-MCNC: 8.6 G/DL (ref 6.4–8.2)
QRS AXIS: 20 DEGREES
QRSD INTERVAL: 96 MS
QT INTERVAL: 426 MS
QTC INTERVAL: 411 MS
RBC # BLD AUTO: 3.84 MILLION/UL (ref 3.88–5.62)
SODIUM SERPL-SCNC: 135 MMOL/L (ref 136–145)
T WAVE AXIS: -25 DEGREES
TROPONIN I SERPL-MCNC: <0.02 NG/ML
VENTRICULAR RATE: 56 BPM
WBC # BLD AUTO: 9.82 THOUSAND/UL (ref 4.31–10.16)

## 2019-10-27 PROCEDURE — 85025 COMPLETE CBC W/AUTO DIFF WBC: CPT | Performed by: PHYSICIAN ASSISTANT

## 2019-10-27 PROCEDURE — 96374 THER/PROPH/DIAG INJ IV PUSH: CPT

## 2019-10-27 PROCEDURE — 93010 ELECTROCARDIOGRAM REPORT: CPT | Performed by: INTERNAL MEDICINE

## 2019-10-27 PROCEDURE — 93005 ELECTROCARDIOGRAM TRACING: CPT

## 2019-10-27 PROCEDURE — 71045 X-RAY EXAM CHEST 1 VIEW: CPT

## 2019-10-27 PROCEDURE — C9113 INJ PANTOPRAZOLE SODIUM, VIA: HCPCS | Performed by: PHYSICIAN ASSISTANT

## 2019-10-27 PROCEDURE — 83690 ASSAY OF LIPASE: CPT | Performed by: PHYSICIAN ASSISTANT

## 2019-10-27 PROCEDURE — 99284 EMERGENCY DEPT VISIT MOD MDM: CPT | Performed by: PHYSICIAN ASSISTANT

## 2019-10-27 PROCEDURE — 80053 COMPREHEN METABOLIC PANEL: CPT | Performed by: PHYSICIAN ASSISTANT

## 2019-10-27 PROCEDURE — 84484 ASSAY OF TROPONIN QUANT: CPT | Performed by: PHYSICIAN ASSISTANT

## 2019-10-27 PROCEDURE — 96375 TX/PRO/DX INJ NEW DRUG ADDON: CPT

## 2019-10-27 PROCEDURE — 36415 COLL VENOUS BLD VENIPUNCTURE: CPT | Performed by: PHYSICIAN ASSISTANT

## 2019-10-27 RX ORDER — SUCRALFATE 1 G/1
1 TABLET ORAL 3 TIMES DAILY
Qty: 30 TABLET | Refills: 0 | Status: SHIPPED | OUTPATIENT
Start: 2019-10-27 | End: 2020-03-30 | Stop reason: SDUPTHER

## 2019-10-27 RX ORDER — LIDOCAINE HYDROCHLORIDE 20 MG/ML
15 SOLUTION OROPHARYNGEAL ONCE
Status: COMPLETED | OUTPATIENT
Start: 2019-10-27 | End: 2019-10-27

## 2019-10-27 RX ORDER — ONDANSETRON 2 MG/ML
4 INJECTION INTRAMUSCULAR; INTRAVENOUS ONCE
Status: COMPLETED | OUTPATIENT
Start: 2019-10-27 | End: 2019-10-27

## 2019-10-27 RX ORDER — PANTOPRAZOLE SODIUM 20 MG/1
40 TABLET, DELAYED RELEASE ORAL DAILY
Qty: 42 TABLET | Refills: 0 | Status: SHIPPED | OUTPATIENT
Start: 2019-10-27 | End: 2020-03-30 | Stop reason: SDUPTHER

## 2019-10-27 RX ORDER — PANTOPRAZOLE SODIUM 40 MG/1
40 INJECTION, POWDER, FOR SOLUTION INTRAVENOUS ONCE
Status: COMPLETED | OUTPATIENT
Start: 2019-10-27 | End: 2019-10-27

## 2019-10-27 RX ORDER — MAGNESIUM HYDROXIDE/ALUMINUM HYDROXICE/SIMETHICONE 120; 1200; 1200 MG/30ML; MG/30ML; MG/30ML
30 SUSPENSION ORAL ONCE
Status: COMPLETED | OUTPATIENT
Start: 2019-10-27 | End: 2019-10-27

## 2019-10-27 RX ORDER — SUCRALFATE 1 G/1
1 TABLET ORAL ONCE
Status: COMPLETED | OUTPATIENT
Start: 2019-10-27 | End: 2019-10-27

## 2019-10-27 RX ADMIN — ALUMINUM HYDROXIDE, MAGNESIUM HYDROXIDE, AND SIMETHICONE 30 ML: 200; 200; 20 SUSPENSION ORAL at 01:09

## 2019-10-27 RX ADMIN — ONDANSETRON 4 MG: 2 INJECTION INTRAMUSCULAR; INTRAVENOUS at 01:11

## 2019-10-27 RX ADMIN — LIDOCAINE HYDROCHLORIDE 15 ML: 20 SOLUTION ORAL; TOPICAL at 01:09

## 2019-10-27 RX ADMIN — PANTOPRAZOLE SODIUM 40 MG: 40 INJECTION, POWDER, FOR SOLUTION INTRAVENOUS at 01:13

## 2019-10-27 RX ADMIN — SUCRALFATE 1 G: 1 TABLET ORAL at 03:32

## 2019-10-28 ENCOUNTER — TELEPHONE (OUTPATIENT)
Dept: OTHER | Facility: HOSPITAL | Age: 49
End: 2019-10-28

## 2019-10-28 ENCOUNTER — TELEPHONE (OUTPATIENT)
Dept: INTERNAL MEDICINE CLINIC | Facility: CLINIC | Age: 49
End: 2019-10-28

## 2019-10-28 NOTE — TELEPHONE ENCOUNTER
Pt called to let petranaypetr know that he did go to er and the dr Cherry Hassan gave him two medication for acid reflux   , they are helping him

## 2019-10-29 ENCOUNTER — REMOTE DEVICE CLINIC VISIT (OUTPATIENT)
Dept: CARDIOLOGY CLINIC | Facility: CLINIC | Age: 49
End: 2019-10-29
Payer: COMMERCIAL

## 2019-10-29 DIAGNOSIS — Z95.810 AICD (AUTOMATIC CARDIOVERTER/DEFIBRILLATOR) PRESENT: Primary | ICD-10-CM

## 2019-10-29 PROCEDURE — 93295 DEV INTERROG REMOTE 1/2/MLT: CPT | Performed by: INTERNAL MEDICINE

## 2019-10-29 PROCEDURE — 93296 REM INTERROG EVL PM/IDS: CPT | Performed by: INTERNAL MEDICINE

## 2019-10-30 NOTE — PROGRESS NOTES
Results for orders placed or performed in visit on 10/29/19   Cardiac EP device report    Narrative    SUB-Q ICD  LATITUDE TRANSMISSION: BATTERY STATUS "OK" (55% REMAINING BATTERY LIFE)  ALL AVAILABLE LEAD PARAMETERS WITHIN NORMAL LIMITS  NO SIGNIFICANT HIGH RATE EPISODES  NORMAL DEVICE FUNCTION   GV

## 2019-11-04 NOTE — ED PROVIDER NOTES
Pt Name: Stacey Gonzales  MRN: [id number]  YOB: 1970  Age/Sex: 51 y o  male  Date of evaluation: 10/26/2019  PCP: Uriel Kruger MD    CHIEF COMPLAINT    Chief Complaint   Patient presents with    Epigastric Pain     vomitting         MARLA Ragsdale presents to the Emergency Department complaining of Abdominal Pain, Vomiting  Stacey Gonzales  is a 51 y o  male who presents due to Abdominal pain, Vomiting  Pt reports Abdominal pain intermittently over the past few days worse with eating with burning, aching sensation epigastrium though developed nausea today with one episode of nonbilious nonbloody vomiting  No other complaints at this time  History provided by:  Patient   used: No    Epigastric Pain   Associated symptoms: abdominal pain, nausea and vomiting    Associated symptoms: no anorexia, no cough, no diaphoresis, no dizziness, no dysphagia, no fatigue, no fever, no headache, no numbness, no palpitations, no shortness of breath and no weakness    Abdominal Pain   Pain location:  Epigastric  Pain quality: aching and burning    Pain radiates to:  Does not radiate  Pain severity:  Moderate  Chronicity:  New  Context: eating    Relieved by:  Nothing  Exacerbated by: eating  Ineffective treatments:  None tried  Associated symptoms: nausea and vomiting    Associated symptoms: no anorexia, no belching, no chest pain, no chills, no constipation, no cough, no diarrhea, no dysuria, no fatigue, no fever, no hematemesis, no hematochezia, no hematuria, no melena, no shortness of breath and no sore throat    Risk factors: no alcohol abuse, no aspirin use, not elderly, has not had multiple surgeries, no NSAID use, not obese and no recent hospitalization            Past Medical and Surgical History    Past Medical History:   Diagnosis Date    Cardiac defibrillator in place        No past surgical history on file      Family History   Problem Relation Age of Onset    Hypertension Maternal Grandmother     Asthma Son     Heart attack Neg Hx     Stroke Neg Hx     Aneurysm Neg Hx     Arrhythmia Neg Hx     Clotting disorder Neg Hx     Hyperlipidemia Neg Hx        Social History     Tobacco Use    Smoking status: Current Some Day Smoker     Types: Cigarettes    Smokeless tobacco: Never Used   Substance Use Topics    Alcohol use: Yes     Alcohol/week: 2 0 standard drinks     Types: 2 Shots of liquor per week     Frequency: 2-3 times a week     Comment: bourbon or vodka    Drug use: No       Allergies    No Known Allergies    Home Medications:    Prior to Admission medications    Medication Sig Start Date End Date Taking?  Authorizing Provider   bisoprolol (ZEBETA) 5 mg tablet Take 1 tablet (5 mg total) by mouth daily 5/17/19   Lennie Mclean MD   cetirizine (ZYRTEC ALLERGY) 10 mg tablet Take 1 tablet by mouth daily as needed    Historical Provider, MD   dicyclomine (BENTYL) 10 mg capsule Take 1 capsule (10 mg total) by mouth 3 (three) times a day before meals 10/24/19 11/23/19  Latisha Thomas MD   Flavoring Agent ([facility]) OIL by Does not apply route 3 (three) times a day with meals Use 2 drops 10/24/19   Latisha Thomas MD   magnesium oxide (MAG-OX) 400 mg tablet Take 1 tablet by mouth 2 (two) times a day 8/26/15   Historical Provider, MD   nicotine (NICODERM CQ) 14 mg/24hr TD 24 hr patch Place 1 patch on the skin every 24 hours 5/23/18   Lennie Mclean MD   pantoprazole (PROTONIX) 20 mg tablet Take 2 tablets (40 mg total) by mouth daily for 21 days 10/27/19 11/17/19  Judi Mcgill PA-C   potassium chloride (MICRO-K) 10 MEQ CR capsule Take 1 capsule by mouth daily as needed 11/17/15   Historical Provider, MD   sucralfate (CARAFATE) 1 g tablet Take 1 tablet (1 g total) by mouth 3 (three) times a day for 10 days 10/27/19 11/6/19  Megan Lai PA-C           Review of Systems    Review of Systems   Constitutional: Negative for activity change, appetite change, chills, diaphoresis, fatigue and fever  HENT: Negative for congestion, drooling, ear discharge, ear pain, facial swelling, postnasal drip, rhinorrhea, sinus pressure, sinus pain, sore throat, trouble swallowing and voice change  Eyes: Negative for discharge and visual disturbance  Respiratory: Negative for apnea, cough, choking, chest tightness, shortness of breath, wheezing and stridor  Cardiovascular: Negative for chest pain, palpitations and leg swelling  Gastrointestinal: Positive for abdominal pain, nausea and vomiting  Negative for abdominal distention, anorexia, constipation, diarrhea, hematemesis, hematochezia and melena  Genitourinary: Negative for decreased urine volume, difficulty urinating, dysuria, flank pain, frequency and hematuria  Musculoskeletal: Negative for arthralgias, joint swelling and neck pain  Skin: Negative for rash  Neurological: Negative for dizziness, weakness, light-headedness, numbness and headaches  Psychiatric/Behavioral: The patient is not nervous/anxious  All other systems reviewed and negative  Physical Exam      ED Triage Vitals [10/26/19 2131]   Temperature Pulse Respirations Blood Pressure SpO2   98 4 °F (36 9 °C) 61 18 164/74 100 %      Temp Source Heart Rate Source Patient Position - Orthostatic VS BP Location FiO2 (%)   Oral Monitor Sitting Left arm --      Pain Score       9               Physical Exam   Constitutional: He is oriented to person, place, and time  He appears well-developed and well-nourished  No distress  HENT:   Head: Normocephalic and atraumatic  Mouth/Throat: Oropharynx is clear and moist    Eyes: Pupils are equal, round, and reactive to light  Conjunctivae and EOM are normal    Neck: Normal range of motion  Neck supple  No hepatojugular reflux and no JVD present  Carotid bruit is not present     Cardiovascular: Normal rate, regular rhythm, normal heart sounds, intact distal pulses and normal pulses  No extrasystoles are present  PMI is not displaced  Exam reveals no gallop and no friction rub  No murmur heard  Pulses:       Radial pulses are 2+ on the right side, and 2+ on the left side  Dorsalis pedis pulses are 2+ on the right side, and 2+ on the left side  Posterior tibial pulses are 2+ on the right side, and 2+ on the left side  Pulmonary/Chest: Effort normal and breath sounds normal  No stridor  No respiratory distress  He has no wheezes  He has no rales  He exhibits no tenderness  Abdominal: Soft  Bowel sounds are normal  He exhibits no distension and no mass  There is no hepatosplenomegaly  There is no tenderness  There is no rigidity, no rebound, no guarding, no CVA tenderness, no tenderness at McBurney's point and negative Hartman's sign  No hernia  Negative Hartman's  Negative Appendiceal signs (Psoas, Rovsing's, Obturator)  Negative Peritoneal Signs   Musculoskeletal: Normal range of motion  Neurological: He is alert and oriented to person, place, and time  Skin: Skin is warm and dry  Capillary refill takes less than 2 seconds  He is not diaphoretic  Nursing note and vitals reviewed            Diagnostic Results    ECG      Labs:    Results for orders placed or performed during the hospital encounter of 10/26/19   CBC and differential   Result Value Ref Range    WBC 9 82 4 31 - 10 16 Thousand/uL    RBC 3 84 (L) 3 88 - 5 62 Million/uL    Hemoglobin 12 1 12 0 - 17 0 g/dL    Hematocrit 36 1 (L) 36 5 - 49 3 %    MCV 94 82 - 98 fL    MCH 31 5 26 8 - 34 3 pg    MCHC 33 5 31 4 - 37 4 g/dL    RDW 13 7 11 6 - 15 1 %    MPV 9 3 8 9 - 12 7 fL    Platelets 660 915 - 455 Thousands/uL    nRBC 0 /100 WBCs    Neutrophils Relative 66 43 - 75 %    Immat GRANS % 1 0 - 2 %    Lymphocytes Relative 20 14 - 44 %    Monocytes Relative 11 4 - 12 %    Eosinophils Relative 1 0 - 6 %    Basophils Relative 1 0 - 1 %    Neutrophils Absolute 6 59 1 85 - 7 62 Thousands/µL    Immature Grans Absolute 0 05 0 00 - 0 20 Thousand/uL    Lymphocytes Absolute 1 93 0 60 - 4 47 Thousands/µL    Monocytes Absolute 1 09 0 17 - 1 22 Thousand/µL    Eosinophils Absolute 0 09 0 00 - 0 61 Thousand/µL    Basophils Absolute 0 07 0 00 - 0 10 Thousands/µL   Comprehensive metabolic panel   Result Value Ref Range    Sodium 135 (L) 136 - 145 mmol/L    Potassium 4 5 3 5 - 5 3 mmol/L    Chloride 101 100 - 108 mmol/L    CO2 27 21 - 32 mmol/L    ANION GAP 7 4 - 13 mmol/L    BUN 10 5 - 25 mg/dL    Creatinine 0 98 0 60 - 1 30 mg/dL    Glucose 110 65 - 140 mg/dL    Calcium 8 7 8 3 - 10 1 mg/dL    AST 53 (H) 5 - 45 U/L    ALT 22 12 - 78 U/L    Alkaline Phosphatase 175 (H) 46 - 116 U/L    Total Protein 8 6 (H) 6 4 - 8 2 g/dL    Albumin 2 3 (L) 3 5 - 5 0 g/dL    Total Bilirubin 1 10 (H) 0 20 - 1 00 mg/dL    eGFR 105 ml/min/1 73sq m   Lipase   Result Value Ref Range    Lipase 476 (H) 73 - 393 u/L   Troponin I   Result Value Ref Range    Troponin I <0 02 <=0 04 ng/mL   ECG 12 lead   Result Value Ref Range    Ventricular Rate 56 BPM    Atrial Rate 56 BPM    CO Interval 158 ms    QRSD Interval 96 ms    QT Interval 426 ms    QTC Interval 411 ms    P [place] 57 degrees    QRS Axis 20 degrees    T Wave Axis -25 degrees       All labs reviewed and utilized in the medical decision making process    Radiology:    XR chest 1 view portable   Final Result      No acute cardiopulmonary disease  Workstation performed: [id number]             All radiology studies independently viewed by me and interpreted by the radiologist     Procedure    ECG 12 Lead Documentation Only  Date/Time: 10/27/2019 12:48 AM  Performed by: Olaf Burkett PA-C  Authorized by:  Olaf Burkett PA-C     Indications / Diagnosis:  Abdominal Pain  ECG reviewed by me, the ED Provider: yes    Patient location:  ED  Previous ECG:     Previous ECG:  Compared to current    Comparison ECG info:   improved t wave inversions laterally    Similarity:  Changes noted    Comparison to cardiac monitor: Yes    Interpretation:     Interpretation: normal    Rate:     ECG rate:  56    ECG rate assessment: normal    Rhythm:     Rhythm: sinus bradycardia    Ectopy:     Ectopy: none    QRS:     QRS axis:  Normal    QRS intervals:  Normal  Conduction:     Conduction: normal    ST segments:     ST segments:  Non-specific  T waves:     T waves: non-specific    Other findings:     Other findings: LVH            Assessment and Plan    MDM  Number of Diagnoses or Management Options  Epigastric abdominal pain: new, needed workup  Nausea & vomiting: new, needed workup     Amount and/or Complexity of Data Reviewed  Clinical lab tests: ordered and reviewed  Tests in the radiology section of CPT®: ordered and reviewed  Tests in the medicine section of CPT®: reviewed and ordered  Review and summarize past medical records: yes  Independent visualization of images, tracings, or specimens: yes    Risk of Complications, Morbidity, and/or Mortality  Presenting problems: moderate  Diagnostic procedures: moderate  Management options: moderate    Patient Progress  Patient progress: improved      Initial ED assessment:  Ger Mares  is a 51 y o  male with significant PMH for CHF, HTN, Cardiomyopathy, Cardiac Defibrillator who presents with Abdominal Pain, Vomiting  Vitals signs reviewed and WNL  Physical examination unremarkable  Initial Ddx  includes but is not limited to:   gastroenteritis, food poisoning, mesenteric adenitis, appendicitis, IBD, IBS, ileus, bowel obstruction, toxic megacolon, colitis, enteritis, gastritis, PUD, GERD, hepatitis, pancreatitis, cholecystitis, biliary colic, choledocholithiasis, doubt splenic etiology; renal colic, pyelonephritis, UTI  Initial ED plan:   Plan will be to perform diagnostic testing of CBC, CMP, Lipase, Troponin, EKG, CXR and treat symptomatically  Final ED summary/disposition: Discussed results of diagnostic testing with pt in detail   Home care recommendations given with discharge paperwork  Return to ED instructions given if new/worsening sxs  MDM  Reviewed: previous chart, nursing note and vitals  Reviewed previous: ECG and x-ray  Interpretation: labs, ECG and x-ray        ED Course of Care and Re-Assessments    ED Course as of Nov 04 0259   Sun Oct 27, 2019   0258 Pt resting comfortably, no vomiting episodes in ED  Medications   aluminum-magnesium hydroxide-simethicone (MYLANTA) 200-200-20 mg/5 mL oral suspension 30 mL (30 mL Oral Given 10/27/19 0109)   Lidocaine Viscous HCl (XYLOCAINE) 2 % mucosal solution 15 mL (15 mL Swish & Spit Given 10/27/19 0109)   pantoprazole (PROTONIX) injection 40 mg (40 mg Intravenous Given 10/27/19 0113)   ondansetron (ZOFRAN) injection 4 mg (4 mg Intravenous Given 10/27/19 0111)   sucralfate (CARAFATE) tablet 1 g (1 g Oral Given 10/27/19 0332)         FINAL IMPRESSION    Final diagnoses:   Nausea & vomiting   Epigastric abdominal pain         DISPOSITION/PLAN  Time reflects when diagnosis was documented in both MDM as applicable and the Disposition within this note     Time User Action Codes Description Comment    10/27/2019  3:24 AM Conception Patel Add [R11 2] Nausea & vomiting     10/27/2019  3:24 AM Conception Patel Add [R10 13] Epigastric abdominal pain       ED Disposition     ED Disposition Condition Date/Time Comment    Discharge Stable [place] Oct 27, 2019  3:23 AM Melyssa Pina Jr  discharge to home/self care              Follow-up Information     Follow up With Specialties Details Why Contact Info Additional [address] Emergency Department Emergency Medicine Go to  If symptoms worsen [address] [facility]  AN ED, [address], [place], [place], [address] [facility] [place] [facility] Call  For follow up Dwayne Elkins  44   [phone] V[facility] Romi Castillo [address] [address] [place], [place], [id number] [phone] [address]  Call  to establish primary care, For follow up [phone] [address] [facility] Internal Medicine [place] [facility] Call  For follow up [address] [id number]  [address] [facility] Internal Medicine [place], [address] [place] [address], [place], [place], [facility]              PATIENT REFERRED TO:    [facility]  [address]  [phone]  Go to   If symptoms worsen    Lauren Machado [facility] [place]  [address] [facility] [address]   [phone]  Call   For follow up    [facility]  [phone]    Call   to establish primary care, For follow up    [address]  [phone]  Call   For follow up      DISCHARGE MEDICATIONS:    Discharge Medication List as of 10/27/2019  3:30 AM      START taking these medications    Details   pantoprazole (PROTONIX) 20 mg tablet Take 2 tablets (40 mg total) by mouth daily for 21 days, Starting Sun 10/27/2019, Until Sun 11/17/2019, Print      sucralfate (CARAFATE) 1 g tablet Take 1 tablet (1 g total) by mouth 3 (three) times a day for 10 days, Starting Sun 10/27/2019, Until Wed 11/6/2019, Print         CONTINUE these medications which have NOT CHANGED    Details   bisoprolol (ZEBETA) 5 mg tablet Take 1 tablet (5 mg total) by mouth daily, Starting Fri 5/17/2019, Normal      cetirizine (ZYRTEC ALLERGY) 10 mg tablet Take 1 tablet by mouth daily as needed, Historical Med      dicyclomine (BENTYL) 10 mg capsule Take 1 capsule (10 mg total) by mouth 3 (three) times a day before meals, Starting Thu 10/24/2019, Until Sat 11/23/2019, Normal      Flavoring Agent (PEPPERMINT FLAVOR) OIL by Does not apply route 3 (three) times a day with meals Use 2 drops, Starting Thu 10/24/2019, Normal      magnesium oxide (MAG-OX) 400 mg tablet Take 1 tablet by mouth 2 (two) times a day, Starting Wed 8/26/2015, Historical Med      nicotine (NICODERM CQ) 14 mg/24hr TD 24 hr patch Place 1 patch on the skin every 24 hours, Starting Wed 5/23/2018, Normal      potassium chloride (MICRO-K) 10 MEQ CR capsule Take 1 capsule by mouth daily as needed, Starting Tue 11/17/2015, Historical Med             No discharge procedures on file           ISIDRO Owen PA-C  11/04/19 2045

## 2020-01-02 ENCOUNTER — OFFICE VISIT (OUTPATIENT)
Dept: INTERNAL MEDICINE CLINIC | Facility: CLINIC | Age: 50
End: 2020-01-02

## 2020-01-02 VITALS
BODY MASS INDEX: 32.14 KG/M2 | DIASTOLIC BLOOD PRESSURE: 62 MMHG | OXYGEN SATURATION: 99 % | RESPIRATION RATE: 16 BRPM | HEART RATE: 60 BPM | TEMPERATURE: 100.1 F | SYSTOLIC BLOOD PRESSURE: 136 MMHG | WEIGHT: 219.2 LBS

## 2020-01-02 DIAGNOSIS — J06.9 URI (UPPER RESPIRATORY INFECTION): Primary | ICD-10-CM

## 2020-01-02 DIAGNOSIS — J02.9 SORE THROAT: ICD-10-CM

## 2020-01-02 LAB — S PYO AG THROAT QL: NEGATIVE

## 2020-01-02 PROCEDURE — 99214 OFFICE O/P EST MOD 30 MIN: CPT | Performed by: INTERNAL MEDICINE

## 2020-01-02 PROCEDURE — 87880 STREP A ASSAY W/OPTIC: CPT | Performed by: INTERNAL MEDICINE

## 2020-01-02 RX ORDER — FLUTICASONE PROPIONATE 50 MCG
1 SPRAY, SUSPENSION (ML) NASAL DAILY
Qty: 1 BOTTLE | Refills: 0 | Status: SHIPPED | OUTPATIENT
Start: 2020-01-02 | End: 2020-02-11

## 2020-01-02 RX ORDER — IBUPROFEN 600 MG/1
600 TABLET ORAL EVERY 6 HOURS PRN
Qty: 30 TABLET | Refills: 0 | Status: SHIPPED | OUTPATIENT
Start: 2020-01-02

## 2020-01-02 NOTE — ASSESSMENT & PLAN NOTE
· Torie Gutierres comes to the clinic today do to sore throat and upper respiratory symptoms including cough, post-nasal drip, and low grade fever that started approximately 6 days ago  He admits to sick contacts with similar symptoms  · On physical exam, patient is febrile with a temperature of 100 1 F,  there is no sinus tenderness  Throat is edematous and erythematous, however there is no oropharyngeal exudate  There is cervical lymphadenopathy  Lungs are clear to auscultation bilaterally  · Rapid Strep A swab is negative  · Patient did not receive the Influenza vaccine this season  · Symptoms correspond to Upper Respiratory Infection likely of viral etiology  · Plan:  · Symptomatic treatment with:   · Flonase twice daily  · Robitussin q8h  · Lozenge as needed  · Ibuprofen as needed for pain and fever  · Follow up on culture results for throat swab  Will update patient with results once available  · Call the office if there is worsening of symptoms or if they do not improve

## 2020-01-02 NOTE — LETTER
January 2, 2020     Patient: Hemant Guzman  YOB: 1970   Date of Visit: 1/2/2020       To Whom it May Concern:    Melyssa Pina is under my professional care  He was seen in my office on 1/2/2020  He may return to work on 01/06/2020  If you have any questions or concerns, please don't hesitate to call           Sincerely,          Jamshid Villegas MD        CC: No Recipients

## 2020-01-02 NOTE — PROGRESS NOTES
Assessment/Plan:    URI (upper respiratory infection)  · Melyssa comes to the clinic today do to sore throat and upper respiratory symptoms including cough, post-nasal drip, and low grade fever that started approximately 6 days ago  He admits to sick contacts with similar symptoms  · On physical exam, patient is febrile with a temperature of 100 1 F,  there is no sinus tenderness  Throat is edematous and erythematous, however there is no oropharyngeal exudate  There is cervical lymphadenopathy  Lungs are clear to auscultation bilaterally  · Rapid Strep A swab is negative  · Patient did not receive the Influenza vaccine this season  · Symptoms correspond to Upper Respiratory Infection likely of viral etiology  · Plan:  · Symptomatic treatment with:   · Flonase twice daily  · Robitussin q8h  · Lozenge as needed  · Ibuprofen as needed for pain and fever  · Follow up on culture results for throat swab  Will update patient with results once available  · Call the office if there is worsening of symptoms or if they do not improve  Diagnoses and all orders for this visit:    URI (upper respiratory infection)  -     fluticasone (FLONASE) 50 mcg/act nasal spray; 1 spray into each nostril daily  -     ibuprofen (MOTRIN) 600 mg tablet; Take 1 tablet (600 mg total) by mouth every 6 (six) hours as needed for mild pain  -     guaiFENesin (ROBITUSSIN) 100 MG/5ML oral liquid; Take 10 mL (200 mg total) by mouth 3 (three) times a day as needed for cough    Sore throat  -     POCT rapid strepA  -     Benzocaine-Menthol (CHLORASEPTIC SORE THROAT) 6-10 MG lozenge; Take 1 lozenge by mouth every 2 (two) hours as needed for sore throat          Subjective:      Patient ID: Michael Tello  is a 51 y o  male      Ronny Morin is a 51year old male with a past medical history of chronic systolic heart failure with EF 45%, who comes to the clinic today due to sore throat and upper respiratory symptoms including cough, postnasal drip and low grade fever, accompanied by general malaise  Patient states his symptoms started approximately 6 days ago  He denies taking any medications to treat his symptoms  He denies recent travel  He does have sick contacts at work with similar symptoms  Patient states the sore throat continues to worsen and is now interfering with eating because of the pain  Patient denies chest pain, shortness of breath, abdominal pain, nausea, vomiting  The following portions of the patient's history were reviewed and updated as appropriate: allergies, current medications, past family history, past medical history, past social history, past surgical history and problem list     Review of Systems   Constitutional: Positive for activity change, appetite change, fatigue and fever  HENT: Positive for congestion, postnasal drip, rhinorrhea, sore throat and trouble swallowing  Negative for ear discharge, ear pain, sinus pressure and sinus pain  Eyes: Negative  Respiratory: Positive for cough  Negative for shortness of breath, wheezing and stridor  Cardiovascular: Negative for chest pain, palpitations and leg swelling  Gastrointestinal: Negative for abdominal distention, abdominal pain, constipation, diarrhea, nausea and vomiting  Endocrine: Negative  Genitourinary: Negative  Musculoskeletal: Positive for arthralgias and myalgias  Skin: Negative for pallor and rash  Neurological: Positive for dizziness  Psychiatric/Behavioral: Negative  Objective:      /62 (BP Location: Right arm, Patient Position: Sitting, Cuff Size: Large)   Pulse 60   Temp 100 1 °F (37 8 °C)   Resp 16   Wt 99 4 kg (219 lb 3 2 oz)   SpO2 99%   BMI 32 14 kg/m²          Physical Exam   Constitutional: He is oriented to person, place, and time  He appears well-developed and well-nourished  No distress  HENT:   Head: Normocephalic and atraumatic  Right Ear: No drainage, swelling or tenderness  No middle ear effusion  Left Ear: No drainage, swelling or tenderness  No middle ear effusion  Nose: Rhinorrhea present  Right sinus exhibits no maxillary sinus tenderness and no frontal sinus tenderness  Left sinus exhibits no maxillary sinus tenderness and no frontal sinus tenderness  Mouth/Throat: Mucous membranes are dry  No uvula swelling  No oropharyngeal exudate or tonsillar abscesses  No tonsillar exudate  Erythematous tonsils  Eyes: Pupils are equal, round, and reactive to light  Conjunctivae and EOM are normal  Right eye exhibits no discharge  Left eye exhibits no discharge  No scleral icterus  Neck: Normal range of motion  Neck supple  No JVD present  No tracheal deviation present  Cardiovascular: Normal rate, regular rhythm and normal heart sounds  Exam reveals no gallop and no friction rub  No murmur heard  Pulmonary/Chest: Effort normal and breath sounds normal  No stridor  No respiratory distress  He has no wheezes  He has no rales  Abdominal: Soft  Bowel sounds are normal  He exhibits no distension  There is no tenderness  There is no guarding  Musculoskeletal: Normal range of motion  He exhibits no edema, tenderness or deformity  Neurological: He is alert and oriented to person, place, and time  Skin: Skin is warm and dry  Capillary refill takes less than 2 seconds  He is not diaphoretic  Psychiatric: He has a normal mood and affect  Nursing note and vitals reviewed

## 2020-01-02 NOTE — PATIENT INSTRUCTIONS
· Use Flonase twice daily  · You can use Cholareseptic spray for your throat  It will relieved the pain  · Take Motrin every 6-8 hours for pain, muscle aches and fever  · Robitussin for the cough up to 3 times a day  · Call the office if symptoms do not improve in 5-7 days      Sore Throat, Ambulatory Care   GENERAL INFORMATION:   A sore throat  is often caused by a cold or flu virus  A sore throat may also be caused by bacteria such as strep  Other causes include smoking, a runny nose, allergies, or acid reflux  Seek immediate care for the following symptoms:   · Trouble breathing or swallowing because your throat is swollen or sore    · Drooling because it hurts too much to swallow    · A painful lump in your throat that does not go away after 5 days    · A fever higher than 102? F (39?C) or lasts longer than 3 days    · Confusion    · Blood in your throat or ear  Treatment for a sore throat  will depend on the cause how severe it is  A sore throat cause by a virus will go away on its own without treatment  You will need antibiotics if your sore throat is caused by bacteria  Your sore throat should start to feel better within 3 to 5 days for both viral and bacterial infections  Care for your sore throat:   · Gargle with salt water  Mix ¼ teaspoon salt in a glass of warm water and gargle  This may help reduce swelling in your throat  · Take ibuprofen or acetaminophen:  These medicines decrease pain and fever  They are available without a doctor's order  Ask your healthcare provider which medicine is best for you  Ask how much to take and how often to take it  · Drink more liquids  Cold or warm drinks may help soothe your sore throat  Drinking liquids can also help prevent dehydration  · Use a cool-steam humidifier  to help moisten the air in your room and reduce your throat pain  · Use lozenges, ice, soft foods, or popsicles  to soothe your throat  · Rest your throat as much as possible    Try not to use your voice  This may irritate your throat and worsen your symptoms  Follow up with your healthcare provider as directed:  Write down your questions so you remember to ask them during your visits  CARE AGREEMENT:   You have the right to help plan your care  Learn about your health condition and how it may be treated  Discuss treatment options with your caregivers to decide what care you want to receive  You always have the right to refuse treatment  The above information is an  only  It is not intended as medical advice for individual conditions or treatments  Talk to your doctor, nurse or pharmacist before following any medical regimen to see if it is safe and effective for you  © 2014 [address] is for End User's use only and may not be sold, redistributed or otherwise used for commercial purposes  All illustrations and images included in CareNotes® are the copyrighted property of A D A M , Inc  or Abdelrahman Palomino

## 2020-01-23 DIAGNOSIS — J06.9 URI (UPPER RESPIRATORY INFECTION): ICD-10-CM

## 2020-02-04 DIAGNOSIS — J06.9 URI (UPPER RESPIRATORY INFECTION): ICD-10-CM

## 2020-02-04 RX ORDER — FLUTICASONE PROPIONATE 50 MCG
1 SPRAY, SUSPENSION (ML) NASAL DAILY
Qty: 1 BOTTLE | Refills: 0 | Status: CANCELLED | OUTPATIENT
Start: 2020-02-04

## 2020-02-04 NOTE — TELEPHONE ENCOUNTER
Patient shouldn't need more Flonase, it was given for a treatment of acute URI  If he persists with symptoms he should schedule an adam  Thanks!

## 2020-02-11 RX ORDER — FLUTICASONE PROPIONATE 50 MCG
SPRAY, SUSPENSION (ML) NASAL
Qty: 16 ML | Refills: 0 | Status: SHIPPED | OUTPATIENT
Start: 2020-02-11

## 2020-03-16 ENCOUNTER — REMOTE DEVICE CLINIC VISIT (OUTPATIENT)
Dept: CARDIOLOGY CLINIC | Facility: CLINIC | Age: 50
End: 2020-03-16
Payer: COMMERCIAL

## 2020-03-16 DIAGNOSIS — Z95.810 AICD (AUTOMATIC CARDIOVERTER/DEFIBRILLATOR) PRESENT: Primary | ICD-10-CM

## 2020-03-16 PROCEDURE — 93296 REM INTERROG EVL PM/IDS: CPT | Performed by: INTERNAL MEDICINE

## 2020-03-16 PROCEDURE — 93295 DEV INTERROG REMOTE 1/2/MLT: CPT | Performed by: INTERNAL MEDICINE

## 2020-03-16 NOTE — PROGRESS NOTES
Results for orders placed or performed in visit on 20   Cardiac EP device report    Narrative    SUB-Q ICD  Sensing Configuration: PrimaryGain SettinXPost Shock Pacing: ON  LATITUDE TRANSMISSION: BATTERY STATUS "50%"  ALL AVAILABLE LEAD PARAMETERS WITHIN NORMAL LIMITS  NO SIGNIFICANT HIGH RATE EPISODES  NORMAL DEVICE FUNCTION   NC       Current Outpatient Medications:     Benzocaine-Menthol (CHLORASEPTIC SORE THROAT) 6-10 MG lozenge, Take 1 lozenge by mouth every 2 (two) hours as needed for sore throat, Disp: 100 tablet, Rfl: 0    bisoprolol (ZEBETA) 5 mg tablet, Take 1 tablet (5 mg total) by mouth daily, Disp: 90 tablet, Rfl: 3    cetirizine (ZYRTEC ALLERGY) 10 mg tablet, Take 1 tablet by mouth daily as needed, Disp: , Rfl:     dicyclomine (BENTYL) 10 mg capsule, Take 1 capsule (10 mg total) by mouth 3 (three) times a day before meals, Disp: 90 capsule, Rfl: 0    Flavoring Agent (PEPPERMINT FLAVOR) OIL, by Does not apply route 3 (three) times a day with meals Use 2 drops, Disp: 1 Bottle, Rfl: 0    fluticasone (FLONASE) 50 mcg/act nasal spray, SPRAY 1 SPRAY INTO EACH NOSTRIL EVERY DAY, Disp: 16 mL, Rfl: 0    guaiFENesin (ROBITUSSIN) 100 MG/5ML oral liquid, Take 10 mL (200 mg total) by mouth 3 (three) times a day as needed for cough, Disp: 120 mL, Rfl: 0    ibuprofen (MOTRIN) 600 mg tablet, Take 1 tablet (600 mg total) by mouth every 6 (six) hours as needed for mild pain, Disp: 30 tablet, Rfl: 0    magnesium oxide (MAG-OX) 400 mg tablet, Take 1 tablet by mouth 2 (two) times a day, Disp: , Rfl:     nicotine (NICODERM CQ) 14 mg/24hr TD 24 hr patch, Place 1 patch on the skin every 24 hours, Disp: 28 patch, Rfl: 0    pantoprazole (PROTONIX) 20 mg tablet, Take 2 tablets (40 mg total) by mouth daily for 21 days (Patient not taking: Reported on 2020), Disp: 42 tablet, Rfl: 0    potassium chloride (MICRO-K) 10 MEQ CR capsule, Take 1 capsule by mouth daily as needed, Disp: , Rfl:     sucralfate (CARAFATE) 1 g tablet, Take 1 tablet (1 g total) by mouth 3 (three) times a day for 10 days (Patient not taking: Reported on 1/2/2020), Disp: 30 tablet, Rfl: 0

## 2020-03-30 ENCOUNTER — TELEMEDICINE (OUTPATIENT)
Dept: INTERNAL MEDICINE CLINIC | Facility: CLINIC | Age: 50
End: 2020-03-30
Payer: COMMERCIAL

## 2020-03-30 DIAGNOSIS — R11.2 NAUSEA & VOMITING: ICD-10-CM

## 2020-03-30 DIAGNOSIS — K21.9 GASTROESOPHAGEAL REFLUX DISEASE WITHOUT ESOPHAGITIS: Primary | ICD-10-CM

## 2020-03-30 DIAGNOSIS — R10.13 EPIGASTRIC ABDOMINAL PAIN: ICD-10-CM

## 2020-03-30 PROCEDURE — G2012 BRIEF CHECK IN BY MD/QHP: HCPCS | Performed by: INTERNAL MEDICINE

## 2020-03-30 RX ORDER — SUCRALFATE 1 G/1
1 TABLET ORAL 3 TIMES DAILY
Qty: 90 TABLET | Refills: 0 | Status: SHIPPED | OUTPATIENT
Start: 2020-03-30 | End: 2020-04-29

## 2020-03-30 RX ORDER — PANTOPRAZOLE SODIUM 20 MG/1
40 TABLET, DELAYED RELEASE ORAL DAILY
Qty: 60 TABLET | Refills: 2 | Status: SHIPPED | OUTPATIENT
Start: 2020-03-30 | End: 2020-04-29

## 2020-03-30 NOTE — PROGRESS NOTES
Virtual Brief Visit    Problem List Items Addressed This Visit        Digestive    Acid reflux - Primary     Patient complains of indigestion for over 1 week, most specifically in the middle of the night  He has had this in the past, during which ED at [facility] [place] prescribed him PPI and sucralfate  This alleviated symptoms  Patient has run out of these medications for >1 month, which is when symptoms restarted and have since gradually increased  Ass  With nausea, nonbloody vomiting, and abdominal pain  No changes in BM, including color changes  Tries to practice non-ulcerogenic diet and habits, encouraged to these  Refilled pantoprazole and sucralfate (x1 month)  Advised to call back in 2 weeks if doesn't notice improvement  Relevant Medications    sucralfate (CARAFATE) 1 g tablet    pantoprazole (PROTONIX) 20 mg tablet      Other Visit Diagnoses     Nausea & vomiting        Relevant Medications    sucralfate (CARAFATE) 1 g tablet    pantoprazole (PROTONIX) 20 mg tablet    Epigastric abdominal pain        Relevant Medications    sucralfate (CARAFATE) 1 g tablet    pantoprazole (PROTONIX) 20 mg tablet                Reason for visit is Indigestion for over 1 week  Encounter provider Pancho Summers MD    Provider located at [address] [place] [id number] [phone]      Recent Visits  No visits were found meeting these conditions     Showing recent visits within past 7 days and meeting all other requirements     Today's Visits  Date Type Provider Dept   03/30/20 Telemedicine Pancho Summers MD [facility] today's visits and meeting all other requirements     Future Appointments  Date Type Provider Dept   03/30/20 Telemedicine Pancho Summers MD [email or web address]   Showing future appointments within next 150 days and meeting all other requirements        After connecting through telephone and patient was informed that this is not a secure, HIPAA-complaint platform  he agrees to proceed  , the patient was identified by name  Mine Amador  was informed that this is a telemedicine visit and that the visit is being conducted through telephone and patient was informed that this is not a secure, HIPAA-complaint platform  he agrees to proceed     My office door was closed  The following individuals were in the room with me and the patient informed Dr Sally Hooker and Dr Arpan Worley  He acknowledged consent and understanding of privacy and security of the video platform  The patient has agreed to participate and understands they can discontinue the visit at any time  Patient is aware this is a billable service  Subjective  Mine Amador  is a 51 y o  male who complains of indigestion for over 1 week  He states that every night, around 2:30 or 3 am, he gets heart burn and nausea  Not aggravated by specific foods, and no changes in his diet recently  States he has been avoiding food due to this  He had similar complaints in the past for which he was prescribed pantoprazole and sucralfate in the ED at [facility] [place], which helped him  He's been out of these medications for >1 month, and his pain has now started to increase, like it did at that time  Associated with chest tightness, mild abdominal pain, and 2 episodes of vomiting  He does state that due to his work hours, he usually tends to eat around 2:30/3 am, and tries to not lay down shortly after this  No sour/metallic taste in mouth  No bloody stool or change in stool color  He has tried to reduce the amount he smokes, from 1 pack every 4-5 days to 1 pack every 2 weeks  He does have a chronic productive cough that is unaltered  Avoids spicy foods and tomatoes especially at night  Past Medical History:   Diagnosis Date    Cardiac defibrillator in place        No past surgical history on file      Current Outpatient Medications   Medication Sig Dispense Refill    Benzocaine-Menthol (CHLORASEPTIC SORE THROAT) 6-10 MG lozenge Take 1 lozenge by mouth every 2 (two) hours as needed for sore throat 100 tablet 0    bisoprolol (ZEBETA) 5 mg tablet Take 1 tablet (5 mg total) by mouth daily 90 tablet 3    cetirizine (ZYRTEC ALLERGY) 10 mg tablet Take 1 tablet by mouth daily as needed      dicyclomine (BENTYL) 10 mg capsule Take 1 capsule (10 mg total) by mouth 3 (three) times a day before meals 90 capsule 0    Flavoring Agent (PEPPERMINT FLAVOR) OIL by Does not apply route 3 (three) times a day with meals Use 2 drops 1 Bottle 0    fluticasone (FLONASE) 50 mcg/act nasal spray SPRAY 1 SPRAY INTO EACH NOSTRIL EVERY DAY 16 mL 0    guaiFENesin (ROBITUSSIN) 100 MG/5ML oral liquid Take 10 mL (200 mg total) by mouth 3 (three) times a day as needed for cough 120 mL 0    ibuprofen (MOTRIN) 600 mg tablet Take 1 tablet (600 mg total) by mouth every 6 (six) hours as needed for mild pain 30 tablet 0    magnesium oxide (MAG-OX) 400 mg tablet Take 1 tablet by mouth 2 (two) times a day      nicotine (NICODERM CQ) 14 mg/24hr TD 24 hr patch Place 1 patch on the skin every 24 hours 28 patch 0    pantoprazole (PROTONIX) 20 mg tablet Take 2 tablets (40 mg total) by mouth daily 60 tablet 2    potassium chloride (MICRO-K) 10 MEQ CR capsule Take 1 capsule by mouth daily as needed      sucralfate (CARAFATE) 1 g tablet Take 1 tablet (1 g total) by mouth 3 (three) times a day 90 tablet 0     No current facility-administered medications for this visit  No Known Allergies    Review of Systems   Respiratory: Positive for cough and chest tightness  Gastrointestinal: Positive for abdominal pain, nausea and vomiting  All other systems reviewed and are negative  I spent 20 minutes with the patient during this visit

## 2020-03-30 NOTE — ASSESSMENT & PLAN NOTE
Patient complains of indigestion for over 1 week, most specifically in the middle of the night  He has had this in the past, during which ED at [facility] [place] prescribed him PPI and sucralfate  This alleviated symptoms  Patient has run out of these medications for >1 month, which is when symptoms restarted and have since gradually increased  Ass  With nausea, nonbloody vomiting, and abdominal pain  No changes in BM, including color changes  Tries to practice non-ulcerogenic diet and habits, encouraged to these  Refilled pantoprazole and sucralfate (x1 month)  Advised to call back in 2 weeks if doesn't notice improvement

## 2020-04-10 ENCOUNTER — TELEMEDICINE (OUTPATIENT)
Dept: CARDIOLOGY CLINIC | Facility: CLINIC | Age: 50
End: 2020-04-10
Payer: COMMERCIAL

## 2020-04-10 DIAGNOSIS — I10 ESSENTIAL HYPERTENSION: ICD-10-CM

## 2020-04-10 DIAGNOSIS — I42.0 DILATED CARDIOMYOPATHY (HCC): Primary | ICD-10-CM

## 2020-04-10 DIAGNOSIS — Z95.810 CARDIAC DEFIBRILLATOR IN PLACE: ICD-10-CM

## 2020-04-10 DIAGNOSIS — I50.22 CHRONIC SYSTOLIC HEART FAILURE (HCC): ICD-10-CM

## 2020-04-10 PROCEDURE — 99214 OFFICE O/P EST MOD 30 MIN: CPT | Performed by: INTERNAL MEDICINE

## 2020-05-21 DIAGNOSIS — I42.0 DILATED CARDIOMYOPATHY (HCC): ICD-10-CM

## 2020-05-21 RX ORDER — BISOPROLOL FUMARATE 5 MG/1
TABLET ORAL
Qty: 90 TABLET | Refills: 3 | Status: SHIPPED | OUTPATIENT
Start: 2020-05-21

## 2020-11-02 ENCOUNTER — REMOTE DEVICE CLINIC VISIT (OUTPATIENT)
Dept: CARDIOLOGY CLINIC | Facility: CLINIC | Age: 50
End: 2020-11-02
Payer: COMMERCIAL

## 2020-11-02 DIAGNOSIS — Z95.810 PRESENCE OF AUTOMATIC CARDIOVERTER/DEFIBRILLATOR (AICD): Primary | ICD-10-CM

## 2020-11-02 PROCEDURE — 93296 REM INTERROG EVL PM/IDS: CPT | Performed by: INTERNAL MEDICINE

## 2020-11-02 PROCEDURE — 93295 DEV INTERROG REMOTE 1/2/MLT: CPT | Performed by: INTERNAL MEDICINE

## 2021-06-25 ENCOUNTER — TELEPHONE (OUTPATIENT)
Dept: INTERNAL MEDICINE CLINIC | Facility: CLINIC | Age: 51
End: 2021-06-25

## 2022-08-31 ENCOUNTER — TELEPHONE (OUTPATIENT)
Dept: INTERNAL MEDICINE CLINIC | Facility: CLINIC | Age: 52
End: 2022-08-31

## 2022-08-31 ENCOUNTER — OFFICE VISIT (OUTPATIENT)
Dept: INTERNAL MEDICINE CLINIC | Facility: CLINIC | Age: 52
End: 2022-08-31
Payer: COMMERCIAL

## 2022-08-31 VITALS
OXYGEN SATURATION: 100 % | DIASTOLIC BLOOD PRESSURE: 88 MMHG | BODY MASS INDEX: 31.46 KG/M2 | WEIGHT: 212.4 LBS | TEMPERATURE: 98.2 F | HEIGHT: 69 IN | HEART RATE: 55 BPM | SYSTOLIC BLOOD PRESSURE: 148 MMHG

## 2022-08-31 DIAGNOSIS — I42.8 NONISCHEMIC CARDIOMYOPATHY (HCC): ICD-10-CM

## 2022-08-31 DIAGNOSIS — I50.22 CHRONIC SYSTOLIC HEART FAILURE (HCC): ICD-10-CM

## 2022-08-31 DIAGNOSIS — R56.9 SEIZURE (HCC): Primary | ICD-10-CM

## 2022-08-31 DIAGNOSIS — S22.42XD CLOSED FRACTURE OF MULTIPLE RIBS OF LEFT SIDE WITH ROUTINE HEALING: Primary | ICD-10-CM

## 2022-08-31 DIAGNOSIS — D69.6 THROMBOCYTOPENIA (HCC): ICD-10-CM

## 2022-08-31 DIAGNOSIS — Z00.00 HEALTHCARE MAINTENANCE: ICD-10-CM

## 2022-08-31 DIAGNOSIS — K74.69 OTHER CIRRHOSIS OF LIVER (HCC): ICD-10-CM

## 2022-08-31 DIAGNOSIS — R56.9 GENERALIZED CONVULSIVE SEIZURE (HCC): ICD-10-CM

## 2022-08-31 DIAGNOSIS — S22.42XD CLOSED FRACTURE OF MULTIPLE RIBS OF LEFT SIDE WITH ROUTINE HEALING: ICD-10-CM

## 2022-08-31 DIAGNOSIS — K21.9 GASTROESOPHAGEAL REFLUX DISEASE, UNSPECIFIED WHETHER ESOPHAGITIS PRESENT: ICD-10-CM

## 2022-08-31 DIAGNOSIS — I10 ESSENTIAL HYPERTENSION: ICD-10-CM

## 2022-08-31 PROBLEM — S06.9XAA TBI (TRAUMATIC BRAIN INJURY): Status: ACTIVE | Noted: 2022-08-31

## 2022-08-31 PROBLEM — J03.90 TONSILLITIS, PHLEGMONOUS: Status: RESOLVED | Noted: 2017-04-02 | Resolved: 2022-08-31

## 2022-08-31 PROBLEM — J36 TONSILLAR ABSCESS: Status: RESOLVED | Noted: 2017-04-02 | Resolved: 2022-08-31

## 2022-08-31 PROBLEM — J02.0 PHARYNGITIS, STREPTOCOCCAL, ACUTE: Status: RESOLVED | Noted: 2017-04-02 | Resolved: 2022-08-31

## 2022-08-31 PROBLEM — S06.9X9A TBI (TRAUMATIC BRAIN INJURY): Status: ACTIVE | Noted: 2022-08-31

## 2022-08-31 PROBLEM — J06.9 URI (UPPER RESPIRATORY INFECTION): Status: RESOLVED | Noted: 2020-01-02 | Resolved: 2022-08-31

## 2022-08-31 PROBLEM — I63.9 CVA (CEREBRAL VASCULAR ACCIDENT) (HCC): Status: ACTIVE | Noted: 2021-04-22

## 2022-08-31 PROBLEM — B37.0 ORAL CANDIDIASIS: Status: RESOLVED | Noted: 2017-04-02 | Resolved: 2022-08-31

## 2022-08-31 PROBLEM — I63.9 CVA (CEREBRAL VASCULAR ACCIDENT) (HCC): Status: RESOLVED | Noted: 2021-04-22 | Resolved: 2022-08-31

## 2022-08-31 PROBLEM — I63.81 MULTIPLE LACUNAR INFARCTS (HCC): Status: ACTIVE | Noted: 2022-08-31

## 2022-08-31 PROCEDURE — 99214 OFFICE O/P EST MOD 30 MIN: CPT | Performed by: INTERNAL MEDICINE

## 2022-08-31 RX ORDER — LEVETIRACETAM 1000 MG/1
1000 TABLET ORAL 2 TIMES DAILY
Qty: 180 TABLET | Refills: 0 | Status: SHIPPED | OUTPATIENT
Start: 2022-08-31 | End: 2022-11-29

## 2022-08-31 RX ORDER — ACETAMINOPHEN 325 MG/1
650 TABLET ORAL EVERY 8 HOURS
COMMUNITY

## 2022-08-31 RX ORDER — LIDOCAINE 50 MG/G
1 PATCH TOPICAL EVERY 24 HOURS
COMMUNITY
Start: 2022-08-29 | End: 2022-09-05

## 2022-08-31 RX ORDER — LIDOCAINE 50 MG/G
1 PATCH TOPICAL DAILY
Qty: 14 PATCH | Refills: 0 | Status: SHIPPED | OUTPATIENT
Start: 2022-08-31 | End: 2022-09-14

## 2022-08-31 RX ORDER — CYCLOBENZAPRINE HCL 5 MG
5 TABLET ORAL 3 TIMES DAILY PRN
Qty: 21 TABLET | Refills: 0 | Status: SHIPPED | OUTPATIENT
Start: 2022-08-31 | End: 2022-09-07

## 2022-08-31 RX ORDER — LOSARTAN POTASSIUM 25 MG/1
1 TABLET ORAL DAILY
COMMUNITY
Start: 2022-08-15

## 2022-08-31 RX ORDER — OXYCODONE HYDROCHLORIDE 5 MG/1
5 TABLET ORAL EVERY 8 HOURS PRN
Qty: 21 TABLET | Refills: 0 | Status: SHIPPED | OUTPATIENT
Start: 2022-08-31 | End: 2022-09-07

## 2022-08-31 RX ORDER — LEVETIRACETAM 750 MG/1
750 TABLET ORAL 2 TIMES DAILY
COMMUNITY
Start: 2022-07-11 | End: 2022-08-31 | Stop reason: SDUPTHER

## 2022-08-31 RX ORDER — MAGNESIUM GLUCONATE 30 MG(550)
30 TABLET ORAL
COMMUNITY

## 2022-08-31 RX ORDER — OXYCODONE HYDROCHLORIDE 5 MG/1
TABLET ORAL
COMMUNITY
Start: 2022-08-29 | End: 2022-08-31 | Stop reason: SDUPTHER

## 2022-08-31 NOTE — ASSESSMENT & PLAN NOTE
· History nonischemic cardiomyopathy most likely after prior infection  · Follows cardiology at [facility] Dr Aníbal Machado  · ICD placed 7 years ago EF at that time 15%  · Last ECHO from April 2021 showed EF 55-60%  · Plan:  · Continue follow-up with Cardiology next appointment in September

## 2022-08-31 NOTE — ASSESSMENT & PLAN NOTE
· Hx trhombocytopenia  · Most recent CBC plt 94 baseline (100-170s)  · Thrombocytopenia most likely due to cirrhosis  · Plan  · CBC

## 2022-08-31 NOTE — ASSESSMENT & PLAN NOTE
Wt Readings from Last 3 Encounters:   08/31/22 96 3 kg (212 lb 6 4 oz)   01/02/20 99 4 kg (219 lb 3 2 oz)   10/26/19 92 1 kg (203 lb)       · History nonischemic cardiomyopathy most likely after prior infection  · Follows cardiology at [address] [place] [address] [address] Darline De La O  · ICD placed 7 years ago EF at that time 15%  · Last ECHO from April 2021 showed EF 55-60%  · Plan:  · As above  · Continue losartan 25 mg tablet

## 2022-08-31 NOTE — ASSESSMENT & PLAN NOTE
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

## 2022-08-31 NOTE — ASSESSMENT & PLAN NOTE
· Sporadic alcohol drinks 1-2 table wine on a weekend  · Recent CT Abdomen at [facility] showed liver changes consistent with hepatic cirrhotic changes and portal hypertension  · CMP with LFT above normal levels  · Given no prior history of hepatitis, no prior drug use cirrhosis most likely due to WADE versus autoimmune versus viral pathologies  · Plan:  · Patient was  about hepatic changes he is in agreement of plan of treatment with further workup  · Patient was  lifestyle modifications included but not limited to tobacco cessation, diet, exercise, alcohol intake decrease  · RUQ US sent today  · Lipid Panel   · Chronic Hepatic panel   · AFP, Alpha 1 antitrypsin  · Ambulatory GI referral sent today for evaluation , EGD and Colonoscopy

## 2022-08-31 NOTE — ASSESSMENT & PLAN NOTE
· 49year old male with pmhx Generalized Seizure Disorder, HTN , CHF, Dilated cardiomyopathy with ICD who presented to [address] [place] [address] ED after an unwitnessed LOS fall  Patient was found laying on the ground by coworkers  · At [facility] CT Chest Abdomen showed  · Multiple closed rib fractures 4-6 level without indications of flail chest    · Liver changes consistent with hepatic steatosis  · Now patient present with moderate/severe chest left arm pain with limited ROM despite pain medication  Have tried home lidocaine patches, oxycodone medication with rebound discomfort  · Plan:  · Abdominal Binder sent today  · Scheduled Tylenol 650mg every 6 to 8 hrs for moderate pain  · Oxycodone 5mg q 6hrs prn for severe breakthrough pain  · Lidocaine patch once a day  · Voltaren gel for local application  · Flexeril 5mg TID for 7 days  · Patient was advice to continue Incentive Spirometry every 1-2 hrs during day time  · Work note sent today for 1 week with open assessment given nature of healing course

## 2022-08-31 NOTE — ASSESSMENT & PLAN NOTE
· Body mass index is 31 37 kg/m²     · Plan:  · Patient was counseled on the benefits and lifestyle modification with diet, increase physical activity, tobacco and alcohol cessation   · Lipid Panel  · HbA1c

## 2022-08-31 NOTE — LETTER
August 31, 2022     Patient: Yoni Lipoma  YOB: 1970  Date of Visit: 8/31/2022      To Whom it May Concern:    Melyssa Pina is under my professional care  Rafaela Acosta was seen in my office on 8/31/2022  Rafaela Acosta may return to work on September 07, 2022  If you have any questions or concerns, please don't hesitate to call           Sincerely,          Enrike Iniguez MD        CC: No Recipients

## 2022-08-31 NOTE — ASSESSMENT & PLAN NOTE
· 49year old male with pmhx Generalized Seizure Disorder, HTN , CHF, Dilated cardiomyopathy with ICD who presented to [address] [place] [address] ED after an unwitnessed LOS fall  Patient was found laying on the ground by coworkers  Denies any preictal symptom such as changes in smell, visual disturbances, chest pain, weakness however woke up with confusion and lethargy  Denies any bowel or urinary incontinence  · CT head was unremarkable  · Patient states compliance home med, takes Levetiracetam 750mg BID  · Last seizure event was approximately 6 years ago  · Follows  Neurology/ Epileptology Dr Ike Forte next appointment in September  · On the ED his Levetiracetam levels were exteremly low  · Plan:  · Increase Levetiracetam 1000mg BID  · Repeat Levetiracetam levels in 2 weeks    · Follow up with Neurology at [address] [place] [address]

## 2022-08-31 NOTE — TELEPHONE ENCOUNTER
Jose Box from [facility] has called the  OS office in regards to [employer]  Jose Box stated they have received the order for the abdominal binder both fax and electronic  Jose Box stated they do not have abdominal binders and will not be able to supply day  Susan Vasquez ended the call soon after

## 2022-08-31 NOTE — PROGRESS NOTES
INTERNAL MEDICINE FOLLOW-UP OFFICE VISIT  [facility] INTERNAL MEDICINE KEKE    NAME: Melyssa Pina Jr  AGE: 47 y o  SEX: male  : 1970     DATE: 2022     Assessment and Plan:     Closed fracture of multiple ribs of left side with routine healing  · 49year old male with pmhx Generalized Seizure Disorder, HTN , CHF, Dilated cardiomyopathy with ICD who presented to [facility] ED after an unwitnessed LOS fall  Patient was found laying on the ground by coworkers  · At [facility] CT Chest Abdomen showed  · Multiple closed rib fractures 4-6 level without indications of flail chest    · Liver changes consistent with hepatic steatosis  · Now patient present with moderate/severe chest left arm pain with limited ROM despite pain medication  Have tried home lidocaine patches, oxycodone medication with rebound discomfort  · Plan:  · Abdominal Binder sent today  · Scheduled Tylenol 650mg every 6 to 8 hrs for moderate pain  · Oxycodone 5mg q 6hrs prn for severe breakthrough pain  · Lidocaine patch once a day  · Voltaren gel for local application  · Flexeril 5mg TID for 7 days  · Patient was advice to continue Incentive Spirometry every 1-2 hrs during day time  · Work note sent today for 1 week with open assessment given nature of healing course  Seizure Like Episode [facility])  · 49year old male with pmhx Generalized Seizure Disorder, HTN , CHF, Dilated cardiomyopathy with ICD who presented to [facility] ED after an unwitnessed LOS fall  Patient was found laying on the ground by coworkers  Denies any preictal symptom such as changes in smell, visual disturbances, chest pain, weakness however woke up with confusion and lethargy  Denies any bowel or urinary incontinence  · CT head was unremarkable  · Patient states compliance home med, takes Levetiracetam 750mg BID  · Last seizure event was approximately 6 years ago    · Follows  Neurology/ Epileptology Dr Kelvin Mart next appointment in September  · On the ED his Levetiracetam levels were exteremly low  · Plan:  · Increase Levetiracetam 1000mg BID  · Repeat Levetiracetam levels in 2 weeks  · Follow up with Neurology at [facility]    Generalized convulsive seizure ([facility] )  · Pmhx generalize complex seizure  · History of TBI 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 play football back in Highschool  · Follows Dr Shaniqua Guajardo [facility]  · Previous EEG have not demonstrated epileptic foci  · Last MRI back in April 2021 showed  · Chronic lacunar infarcts of the basal ganglia  · Chronic 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 callosum  · Plan:  · Increase Keppra 1000 mg tablet b i d  today  · Follow-up with neurology [facility]  · Follow-up with levetiracetam levels in 2 weeks       Nonischemic cardiomyopathy ([facility] )  · History nonischemic cardiomyopathy most likely after prior infection  · Follows cardiology at [facility] Dr Jarrett Voss  · ICD placed 7 years ago EF at that time 15%  · Last ECHO from April 2021 showed EF 55-60%  · Plan:  · Continue follow-up with Cardiology next appointment in September    Chronic systolic heart failure ([facility] )  Wt Readings from Last 3 Encounters:   08/31/22 96 3 kg (212 lb 6 4 oz)   01/02/20 99 4 kg (219 lb 3 2 oz)   10/26/19 92 1 kg (203 lb)       · History nonischemic cardiomyopathy most likely after prior infection  · Follows cardiology at [facility] Dr Jarrett Voss  · ICD placed 7 years ago EF at that time 15%  · Last ECHO from April 2021 showed EF 55-60%  · Plan:  · As above  · Continue losartan 25 mg tablet      Benign essential hypertension  · Past medical history hypertension, current light tobacco smoker  Smokes approximately 1 Pack per week     · Most recent MRI 2021  · Multiple lacunar infarcts of the basal ganglia  · BP in office not at goal  · Currently on Zebeta 5mg tablet , Losartan 25mg tablet  ·   · Plan:  · Patient was advised to monitor blood pressure home created lot for least 2 weeks before next appointment  · Continue medication, consideration to increase home medication pending blood pressure values at home  · Patient was counseled benefits of tobacco cessation     Other cirrhosis of liver (HCC)  · Sporadic alcohol drinks 1-2 table wine on a weekend  · Recent CT Abdomen at [facility] showed liver changes consistent with hepatic cirrhotic changes and portal hypertension  · CMP with LFT above normal levels  · Given no prior history of hepatitis, no prior drug use cirrhosis most likely due to WADE versus autoimmune versus viral pathologies  · Plan:  · Patient was  about hepatic changes he is in agreement of plan of treatment with further workup  · Patient was  lifestyle modifications included but not limited to tobacco cessation, diet, exercise, alcohol intake decrease  · RUQ US sent today  · Lipid Panel   · Chronic Hepatic panel   · AFP, Alpha 1 antitrypsin  · Ambulatory GI referral sent today for evaluation , EGD and Colonoscopy  GERD (gastroesophageal reflux disease)  · HX of GERD  · OTC antiacid mediction  · No GI complains at this time, exacerbate by diet  · Pan:  · Continue with nonulcerogenic diet, avoid citrics , spicy food and tomatoes sauce  Thrombocytopenia (HCC)  · Hx trhombocytopenia  · Most recent CBC plt 94 baseline (100-170s)  · Thrombocytopenia most likely due to cirrhosis  · Plan  · CBC    BMI 31 0-31 9,adult  · Body mass index is 31 37 kg/m²     · Plan:  · Patient was counseled on the benefits and lifestyle modification with diet, increase physical activity, tobacco and alcohol cessation   · Lipid Panel  · HbA1c    Nutrition Assessment and Intervention:     Recommended completion of food recall journal      Tobacco and Toxic Substance Assessment and Intervention:     Tobacco cessation counseling provided    Alcohol cessation counseling provided      Return in about 4 weeks (around 9/28/2022) for Annual physical      Chief Complaint:     Chief Complaint   Patient presents with    Follow-up     ED visit 08/29/22        History of Present Illness:       Silvana is a 52-year-old male with past medical history of which generalized seizures, chronic systolic heart failure, nonischemic cardiomyopathy, benign hypertension who presented office today for follow-up after a seizure-like episode that took place on 8/27  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, was found by coworkers lying on the floor  States that prior his fall he does not recall any visual disturbances, changes in smell, chest pain, dyspnea, or weakness that may have attribute this episode  He woke up with confusion and lethargy that lasted approximately 5 minute, he denies any urinary or bowel incontinence as a result of the episode  He was seen at [facility] however left AMA  The morning after patient notice chest pain, discomfort and left arm ROM limitation as well as head swelling prompt him to go back at [address] [place] [address] ED  At the ED CT Chest show multiple nondisplaced rib fracture no evidence of chest frail  Patient was discharged with supportive medications  Patient has been follow-up by [address] [place] [address] Neurology team, state that has been seizure-free for the past 6 years, has been compliant with his current medication  Also has been seen by Cardiology for his a ICD and cardiomyopathy status    Of Note CT Abdomen showed hepatic changes consistent with liver cirrhosis and portal hypertension  The following portions of the patient's history were reviewed and updated as appropriate: allergies, current medications, past family history, past medical history, past social history, past surgical history and problem list      Review of Systems:     Review of Systems   Constitutional: Negative for activity change, appetite change, chills and fever  HENT: Negative for ear pain and sore throat  Eyes: Negative for pain and visual disturbance  Respiratory: Negative for cough, shortness of breath and wheezing  Cardiovascular: Positive for chest pain  Negative for palpitations and leg swelling          Chest pain more so on the left side   Gastrointestinal: Negative for abdominal distention, abdominal pain, constipation and vomiting  Genitourinary: Negative for difficulty urinating, dysuria and hematuria  Musculoskeletal: Negative for arthralgias, back pain, neck pain and neck stiffness  Skin: Negative for color change and rash  Neurological: Negative for dizziness, seizures, syncope, weakness, light-headedness, numbness and headaches  All other systems reviewed and are negative  Problem List:     Patient Active Problem List   Diagnosis    Cardiac defibrillator in place    Chronic systolic heart failure (HCC)    Benign essential hypertension    Nonischemic cardiomyopathy (HCC)    IBS (irritable bowel syndrome)    GERD (gastroesophageal reflux disease)    Generalized convulsive seizure ([facility] )    Multiple lacunar infarcts (HCC)    Other cirrhosis of liver ([facility] )    Closed fracture of multiple ribs of left side with routine healing    Thrombocytopenia ([facility] )    TBI (traumatic brain injury) ([facility] )    BMI 31 0-31 9,adult    Seizure Like Episode ([facility] )        Objective:     /88 (BP Location: Left arm, Patient Position: Sitting, Cuff Size: Standard)   Pulse 55   Temp 98 2 °F (36 8 °C) (Tympanic)   Ht 5' 9" (1 753 m)   Wt 96 3 kg (212 lb 6 4 oz)   SpO2 100%   BMI 31 37 kg/m²     Physical Exam  Vitals and nursing note reviewed  Constitutional:       General: He is not in acute distress  Appearance: Normal appearance  He is well-developed  He is ill-appearing  HENT:      Head: Normocephalic and atraumatic  Right Ear: External ear normal       Left Ear: External ear normal       Nose: Nose normal       Mouth/Throat:      Mouth: Mucous membranes are moist    Eyes:      Extraocular Movements: Extraocular movements intact  Conjunctiva/sclera: Conjunctivae normal       Pupils: Pupils are equal, round, and reactive to light     Cardiovascular:      Rate and Rhythm: Normal rate and regular rhythm  Pulses: Normal pulses  Heart sounds: Normal heart sounds  No murmur heard  Pulmonary:      Effort: Pulmonary effort is normal  No respiratory distress  Breath sounds: Normal breath sounds  Comments: Bilateral chest tender to palpation more son on the left  Chest:      Chest wall: Tenderness present  Abdominal:      General: Bowel sounds are normal  There is no distension  Palpations: Abdomen is soft  Tenderness: There is no abdominal tenderness  There is no guarding  Musculoskeletal:         General: No tenderness  Cervical back: Normal range of motion and neck supple  No tenderness  Right lower leg: No edema  Left lower leg: No edema  Comments: Left arm ROM limited due to chest pain MSK pain    Skin:     General: Skin is warm  Capillary Refill: Capillary refill takes less than 2 seconds  Comments: Left arm anteromedial hematoma   Neurological:      General: No focal deficit present  Mental Status: He is alert and oriented to person, place, and time  Psychiatric:         Mood and Affect: Mood normal          Behavior: Behavior normal          Thought Content: Thought content normal          Judgment: Judgment normal          Pertinent Laboratory/Diagnostic Studies:    Laboratory Results: I have personally reviewed the pertinent laboratory results/reports     CBC: No results for input(s): WBC, RBC, HGB, HCT, MCV, MCH, MCHC, RDW, MPV, PLT, NRBC, NEUTOPHILPCT, LYMPHOPCT, MONOPCT, EOSPCT, BASOPCT, NEUTROABS, LYMPHSABS, MONOSABS, EOSABS, MONOSABS in the last 8784 hours  Chemistry Profile: No results for input(s): NA, K, CL, CO2, ANIONGAP, BUN, CREATININE, GLUF, GLUC, GLUCOSE, CALCIUM, CORRECTEDCA, MG, PHOS, AST, ALT, ALKPHOS, PROT, BILITOT, EGFR, GFRAA, GFRNONAA, EGFRAA, EGFRNAA, EGFRNONAFA in the last 8784 hours      Invalid input(s): EXTSODIUM, EXTPOTASSIUM, EXTCO2, EXTANIONGAP, EXTBUN, EXTCREAT, EXTGLUBLD, GLU, SLAMBGLUCOSE, EXTCALCIUM, CAADJUST, ALBUMIN, SERUMALBUMIN, EXTEGFR    Radiology/Other Diagnostic Testing Results: I have personally reviewed pertinent reports        Arnel Wright MD  [facility] INTERNAL MEDICINE Carolyn Mukherjee

## 2022-08-31 NOTE — ASSESSMENT & PLAN NOTE
· HX of GERD  · OTC antiacid mediction  · No GI complains at this time, exacerbate by diet  · Pan:  · Continue with nonulcerogenic diet, avoid citrics , spicy food and tomatoes sauce

## 2022-09-01 DIAGNOSIS — K74.69 OTHER CIRRHOSIS OF LIVER (HCC): Primary | ICD-10-CM

## 2022-09-04 ENCOUNTER — HOSPITAL ENCOUNTER (OUTPATIENT)
Dept: ULTRASOUND IMAGING | Facility: HOSPITAL | Age: 52
Discharge: HOME | End: 2022-09-04
Payer: COMMERCIAL

## 2022-09-04 DIAGNOSIS — K74.69 OTHER CIRRHOSIS OF LIVER (HCC): ICD-10-CM

## 2022-09-04 PROCEDURE — 76705 ECHO EXAM OF ABDOMEN: CPT

## 2022-09-14 ENCOUNTER — TELEPHONE (OUTPATIENT)
Dept: INTERNAL MEDICINE CLINIC | Facility: CLINIC | Age: 52
End: 2022-09-14

## 2022-09-14 NOTE — TELEPHONE ENCOUNTER
I called to [facility] and they said insurance does not cover this product and if the patient would like this product he will need to purchase it on his own at a pharmacy or DMe provider that has the product in [address] [facility] does not always have it in stock so he should call to verify  Pt will come by to  script today

## 2022-10-10 ENCOUNTER — OFFICE VISIT (OUTPATIENT)
Dept: GASTROENTEROLOGY | Facility: CLINIC | Age: 52
End: 2022-10-10
Payer: COMMERCIAL

## 2022-10-10 VITALS
DIASTOLIC BLOOD PRESSURE: 90 MMHG | HEART RATE: 63 BPM | BODY MASS INDEX: 31.1 KG/M2 | HEIGHT: 69 IN | SYSTOLIC BLOOD PRESSURE: 112 MMHG | WEIGHT: 210 LBS

## 2022-10-10 DIAGNOSIS — K74.60 CIRRHOSIS OF LIVER WITHOUT ASCITES, UNSPECIFIED HEPATIC CIRRHOSIS TYPE (HCC): ICD-10-CM

## 2022-10-10 DIAGNOSIS — Z95.810 CARDIAC DEFIBRILLATOR IN PLACE: ICD-10-CM

## 2022-10-10 DIAGNOSIS — K80.20 GALLSTONES: ICD-10-CM

## 2022-10-10 DIAGNOSIS — K76.0 FATTY LIVER: ICD-10-CM

## 2022-10-10 DIAGNOSIS — R16.0 HEPATOMEGALY: ICD-10-CM

## 2022-10-10 DIAGNOSIS — K74.69 OTHER CIRRHOSIS OF LIVER (HCC): Primary | ICD-10-CM

## 2022-10-10 DIAGNOSIS — R56.9 GENERALIZED CONVULSIVE SEIZURE (HCC): ICD-10-CM

## 2022-10-10 PROCEDURE — 99204 OFFICE O/P NEW MOD 45 MIN: CPT | Performed by: INTERNAL MEDICINE

## 2022-10-10 RX ORDER — LEVETIRACETAM 750 MG/1
TABLET ORAL
COMMUNITY
Start: 2022-10-06

## 2022-10-10 NOTE — PROGRESS NOTES
[facility] [address] Consultation  Melyssa Burns  47 y o  male MRN: [id number]  Encounter: [id number]          ASSESSMENT AND PLAN:      1  Other cirrhosis of liver ([facility] )  -     Ambulatory Referral to Gastroenterology    2  Hepatomegaly    3  Fatty liver    4  Cirrhosis of liver without ascites, unspecified hepatic cirrhosis type ([facility] )    5  Gallstones    6  Generalized convulsive seizure ([facility] )    7  Cardiac defibrillator in place      Patient has a history of elevated LFTs as follows, Alp 152  Alb 2 9, ast 103, tbili 2 3, plt 116, ultrasound hepatomegaly fatty liver and cirrhosis  There is some area of possible focal fatty sparing  Etiology remains unclear, could be WADE  Liver biopsy done on December 30, 2015 was generally unrevealing,  patient does not recall any workup for liver disease and or liver biopsy done in the past   This was discussed at length with patient, because of his ICD cannot have an MRI done, will schedule elastography, complete workup for any specific etiology, seems like most of his workup was unrevealing 7-8 years ago  Complications fatal complications and risk of liver cancer discussed, follow-up with me in 1 month, at that time will schedule EGD colonoscopy as well  He thinks he may have had a colonoscopy done more than 20 years ago  History of gallstones on ultrasound, ICD seizure disorder       ______________________________________________________________________    HPI:     Thank you for ask me see this patient for evaluation of his abnormal LFTs and possible cirrhosis  He had seizures few weeks ago and found to have elevated LFTs ultrasound done suggested hepatomegaly fatty liver and cirrhosis sent for GI evaluation  He denies any in history of jaundice easy bleeding bruising  Denies any history of IV drug use blood transfusions excessive alcohol use, he used to play football for [address], has had appendectomy and shoulder surgery    Upon review of records he had a liver biopsy done on December 30, 2015, 5-10% fat, no significant cirrhosis her fibrosis was reported  He has a history of ICD for workup for dizziness 7 years ago, appetite is fair weight stable denies any chest pain shortness of breath, walks several miles a day, works in a [employer]  Diet medications more than 10 pertinent systems some of the prior and current records were reviewed  REVIEW OF SYSTEMS:    CONSTITUTIONAL: Denies any fever, chills, rigors, and weight loss  HEENT: No earache or tinnitus  CARDIOVASCULAR: No chest pain or palpitations  RESPIRATORY: Denies any cough, hemoptysis, shortness of breath or dyspnea on exertion  GASTROINTESTINAL: As noted in the History of Present Illness  GENITOURINARY: Denies any hematuria or dysuria  NEUROLOGIC: No dizziness or vertigo  MUSCULOSKELETAL: Denies any joint swellings  SKIN: Denies skin rashes or itching  ENDOCRINE: Denies excessive thirst  Denies intolerance to heat or cold  PSYCHOSOCIAL: Denies depression or anxiety  Denies any recent memory loss         Historical Information   Past Medical History:   Diagnosis Date   • Cardiac defibrillator in place    • Colon polyp      Past Surgical History:   Procedure Laterality Date   • COLONOSCOPY       Social History   Social History     Substance and Sexual Activity   Alcohol Use Yes   • Alcohol/week: 2 0 standard drinks   • Types: 2 Shots of liquor per week    Comment: bourbon or vodka     Social History     Substance and Sexual Activity   Drug Use No     Social History     Tobacco Use   Smoking Status Current Some Day Smoker   • Types: Cigarettes   Smokeless Tobacco Never Used     Family History   Problem Relation Age of Onset   • Hypertension Maternal Grandmother    • Asthma Son    • Heart attack Neg Hx    • Stroke Neg Hx    • Aneurysm Neg Hx    • Arrhythmia Neg Hx    • Clotting disorder Neg Hx    • Hyperlipidemia Neg Hx        Meds/Allergies       Current Outpatient Medications:   •  acetaminophen (TYLENOL) 325 mg tablet  •  Benzocaine-Menthol (CHLORASEPTIC SORE THROAT) 6-10 MG lozenge  •  bisoprolol (ZEBETA) 5 mg tablet  •  cetirizine (ZyrTEC) 10 mg tablet  •  Elastic Bandages & Supports (Rib Belt/Mens Large) MISC  •  Flavoring Agent (PEPPERMINT FLAVOR) OIL  •  fluticasone (FLONASE) 50 mcg/act nasal spray  •  guaiFENesin (ROBITUSSIN) 100 MG/5ML oral liquid  •  levETIRAcetam (KEPPRA) 1000 MG tablet  •  levETIRAcetam (KEPPRA) 750 mg tablet  •  losartan (COZAAR) 25 mg tablet  •  Magnesium Gluconate 550 MG TABS  •  nicotine (NICODERM CQ) 14 mg/24hr TD 24 hr patch  •  potassium chloride (MICRO-K) 10 MEQ CR capsule  •  cyclobenzaprine (FLEXERIL) 5 mg tablet  •  Diclofenac Sodium (VOLTAREN) 1 %  •  dicyclomine (BENTYL) 10 mg capsule  •  lidocaine (LIDODERM) 5 %  •  lidocaine (Lidoderm) 5 %  •  pantoprazole (PROTONIX) 20 mg tablet  •  sucralfate (CARAFATE) 1 g tablet    No Known Allergies        Objective     Blood pressure 112/90, pulse 63, height 5' 9" (1 753 m), weight 95 3 kg (210 lb)  Body mass index is 31 01 kg/m²  PHYSICAL EXAM:      General Appearance:   Alert, cooperative, no distress   HEENT:   Normocephalic, atraumatic, anicteric  Neck:  Supple, symmetrical, trachea midline   Lungs:   Clear to auscultation bilaterally; no rales, rhonchi or wheezing; respirations unlabored    Heart[de-identified]   Regular rate and rhythm; no murmur  Abdomen:   Soft, non-tender, non-distended; normal bowel sounds; no masses, no organomegaly    Genitalia:   Deferred    Rectal:   Deferred    Extremities:  No cyanosis, clubbing or edema    Skin:  No jaundice, rashes, or lesions    Lymph nodes:  No palpable cervical lymphadenopathy        Lab Results:   No visits with results within 1 Day(s) from this visit  Latest known visit with results is:   Office Visit on 01/02/2020   Component Date Value   •  RAPID STREP A 01/02/2020 Negative          Radiology Results:   No results found

## 2022-11-23 ENCOUNTER — TELEPHONE (OUTPATIENT)
Dept: GASTROENTEROLOGY | Facility: CLINIC | Age: 52
End: 2022-11-23

## 2022-11-23 DIAGNOSIS — R56.9 GENERALIZED CONVULSIVE SEIZURE (HCC): ICD-10-CM

## 2022-11-23 RX ORDER — LEVETIRACETAM 1000 MG/1
TABLET ORAL
Qty: 180 TABLET | Refills: 0 | Status: SHIPPED | OUTPATIENT
Start: 2022-11-23

## 2022-11-23 NOTE — TELEPHONE ENCOUNTER
Melyssa Pina has requested a refill of levETIRAcetam (KEPPRA) 1000 MG tablet  Would a refill be appropriate?

## 2022-12-07 ENCOUNTER — APPOINTMENT (OUTPATIENT)
Dept: LAB | Facility: CLINIC | Age: 52
End: 2022-12-07

## 2022-12-07 DIAGNOSIS — K74.69 OTHER CIRRHOSIS OF LIVER (HCC): ICD-10-CM

## 2022-12-07 DIAGNOSIS — R16.0 HEPATOMEGALY: ICD-10-CM

## 2022-12-07 LAB — HBV SURFACE AB SER-ACNC: <3.1 MIU/ML

## 2022-12-08 LAB
ANA SER QL IA: NEGATIVE
CERULOPLASMIN SERPL-MCNC: 27.3 MG/DL (ref 16–31)

## 2022-12-09 ENCOUNTER — OFFICE VISIT (OUTPATIENT)
Dept: GASTROENTEROLOGY | Facility: CLINIC | Age: 52
End: 2022-12-09

## 2022-12-09 VITALS — WEIGHT: 207.8 LBS | HEIGHT: 70 IN | BODY MASS INDEX: 29.75 KG/M2

## 2022-12-09 DIAGNOSIS — K21.9 GASTROESOPHAGEAL REFLUX DISEASE WITHOUT ESOPHAGITIS: ICD-10-CM

## 2022-12-09 DIAGNOSIS — Z12.11 SCREEN FOR COLON CANCER: ICD-10-CM

## 2022-12-09 DIAGNOSIS — R93.5 ABNORMAL ULTRASOUND OF ABDOMEN: ICD-10-CM

## 2022-12-09 DIAGNOSIS — K74.69 OTHER CIRRHOSIS OF LIVER (HCC): Primary | ICD-10-CM

## 2022-12-09 RX ORDER — POLYETHYLENE GLYCOL 3350, SODIUM SULFATE ANHYDROUS, SODIUM BICARBONATE, SODIUM CHLORIDE, POTASSIUM CHLORIDE 236; 22.74; 6.74; 5.86; 2.97 G/4L; G/4L; G/4L; G/4L; G/4L
4 POWDER, FOR SOLUTION ORAL ONCE
Qty: 4000 ML | Refills: 0 | Status: SHIPPED | OUTPATIENT
Start: 2022-12-09 | End: 2022-12-09

## 2022-12-09 NOTE — PROGRESS NOTES
[facility] - Outpatient Follow-up Note  Teri Castaneda  47 y o  male MRN: [id number]  Encounter: [id number]          ASSESSMENT AND PLAN:      1  Other cirrhosis of liver ([address] )  -     US abdomen limited; Future; Expected date: 12/09/2022  -     Hepatic function panel; Future  -     Ceruloplasmin; Future  -     AFP tumor marker; Future  -     Iron Panel (Includes Ferritin, Iron Sat%, Iron, and TIBC); Future  -     JOSÉ LUIS Screen w/ Reflex to Titer/Pattern; Future  -     Anti-smooth muscle antibody, IgG; Future  -     CBC and differential; Future  -     EGD; Future; Expected date: 12/09/2022    2  Abnormal ultrasound of abdomen  -     US abdomen limited; Future; Expected date: 12/09/2022    3  Screen for colon cancer  -     Colonoscopy; Future; Expected date: 12/09/2022  -     EGD; Future; Expected date: 12/09/2022  -     polyethylene glycol (Golytely) 4000 mL solution; Take 4,000 mL by mouth once for 1 dose Take according to instructions given by the office for colonoscopy bowel prep  4  Gastroesophageal reflux disease without esophagitis  -     EGD; Future; Expected date: 12/09/2022      History of mild elevation of LFTs, clinically stable, ultrasound had suggested possible for the focal fatty sparing, will do a follow-up ultrasound in elastography to check for hepatic steatosis and any fibrosis  We will check labs again to determine an etiology of chronic liver disease,  History of ICD  Screening colonoscopy, so he will call to schedule, instructions given  History of GERD  Schedule EGD   ______________________________________________________________________    SUBJECTIVE:     Patient came in for follow-up evaluation with history of liver disease, he went for labs but somehow, only some of the labs were drawn    He denies any symptoms of abdominal pain GI bleeding itching bruising or bleeding appetite is fair weight stable, quite active, he does have a history of chronic liver disease, liver biopsy on December 30, 2015 revealed 5 to 10% fat no significant fibrosis, his appetite is fair weight stable, diet medications more than 10 system reviewed, has ICD  Works in [employer]  Diet medications more than 10 systems reviewed  REVIEW OF SYSTEMS IS OTHERWISE NEGATIVE        Historical Information   Past Medical History:   Diagnosis Date   • Cardiac defibrillator in place    • Colon polyp    • Hypertension      Past Surgical History:   Procedure Laterality Date   • APPENDECTOMY     • COLONOSCOPY       Social History   Social History     Substance and Sexual Activity   Alcohol Use Yes   • Alcohol/week: 2 0 standard drinks   • Types: 2 Shots of liquor per week    Comment: bourbon or vodka     Social History     Substance and Sexual Activity   Drug Use No     Social History     Tobacco Use   Smoking Status Some Days   • Types: Cigarettes   Smokeless Tobacco Never     Family History   Problem Relation Age of Onset   • Hypertension Maternal Grandmother    • Asthma Son    • Heart attack Neg Hx    • Stroke Neg Hx    • Aneurysm Neg Hx    • Arrhythmia Neg Hx    • Clotting disorder Neg Hx    • Hyperlipidemia Neg Hx        Meds/Allergies       Current Outpatient Medications:   •  acetaminophen (TYLENOL) 325 mg tablet  •  bisoprolol (ZEBETA) 5 mg tablet  •  dicyclomine (BENTYL) 10 mg capsule  •  Elastic Bandages & Supports (Rib Belt/Mens Large) MISC  •  fluticasone (FLONASE) 50 mcg/act nasal spray  •  levETIRAcetam (KEPPRA) 1000 MG tablet  •  losartan (COZAAR) 25 mg tablet  •  nicotine (NICODERM CQ) 14 mg/24hr TD 24 hr patch  •  polyethylene glycol (Golytely) 4000 mL solution  •  potassium chloride (MICRO-K) 10 MEQ CR capsule  •  Benzocaine-Menthol (CHLORASEPTIC SORE THROAT) 6-10 MG lozenge  •  cetirizine (ZyrTEC) 10 mg tablet  •  cyclobenzaprine (FLEXERIL) 5 mg tablet  •  Diclofenac Sodium (VOLTAREN) 1 %  •  Flavoring Agent (PEPPERMINT FLAVOR) OIL  •  guaiFENesin (ROBITUSSIN) 100 MG/5ML oral liquid  • levETIRAcetam (KEPPRA) 750 mg tablet  •  lidocaine (LIDODERM) 5 %  •  lidocaine (Lidoderm) 5 %  •  Magnesium Gluconate 550 MG TABS  •  pantoprazole (PROTONIX) 20 mg tablet  •  sucralfate (CARAFATE) 1 g tablet    No Known Allergies        Objective     Height 5' 9 5" (1 765 m), weight 94 3 kg (207 lb 12 8 oz)  Body mass index is 30 25 kg/m²  PHYSICAL EXAM:      General Appearance:   Alert, cooperative, no distress   HEENT:   Normocephalic, atraumatic, anicteric  Neck:  Supple, symmetrical, trachea midline   Lungs:   Clear to auscultation bilaterally; no rales, rhonchi or wheezing; respirations unlabored    Heart[de-identified]   Regular rate and rhythm; no murmur  Abdomen:   Soft, non-tender, non-distended; normal bowel sounds; no masses, no organomegaly    Genitalia:   Deferred    Rectal:   Deferred    Extremities:  No cyanosis, clubbing or edema    Skin:  No jaundice, rashes, or lesions    Lymph nodes:  No palpable cervical lymphadenopathy        Lab Results:   No visits with results within 1 Day(s) from this visit  Latest known visit with results is:   Appointment on 12/07/2022   Component Date Value   • Ceruloplasmin 12/07/2022 27 3    • Hep B S Ab 12/07/2022 <3 10    • JOSÉ LUIS 12/07/2022 Negative          Radiology Results:   No results found

## 2022-12-19 ENCOUNTER — TELEPHONE (OUTPATIENT)
Dept: GASTROENTEROLOGY | Facility: CLINIC | Age: 52
End: 2022-12-19

## 2022-12-19 NOTE — TELEPHONE ENCOUNTER
Scheduled date of EGD/colonoscopy (as of today): 1/3/23     Physician performing EGD/colonoscopy: Nick Sanders    [facility]    Pt has PREP and Neisha Castro ordered, pt wanted ASAP

## 2022-12-20 ENCOUNTER — HOSPITAL ENCOUNTER (OUTPATIENT)
Dept: ULTRASOUND IMAGING | Facility: HOSPITAL | Age: 52
Discharge: HOME | End: 2022-12-20
Attending: INTERNAL MEDICINE

## 2022-12-20 DIAGNOSIS — K74.69 OTHER CIRRHOSIS OF LIVER (HCC): ICD-10-CM

## 2022-12-20 DIAGNOSIS — R93.5 ABNORMAL ULTRASOUND OF ABDOMEN: ICD-10-CM

## 2022-12-20 DIAGNOSIS — R16.0 HEPATOMEGALY: ICD-10-CM

## 2022-12-22 ENCOUNTER — TELEPHONE (OUTPATIENT)
Dept: GASTROENTEROLOGY | Facility: CLINIC | Age: 52
End: 2022-12-22

## 2022-12-22 NOTE — TELEPHONE ENCOUNTER
lmom confirming pt's colonoscopy/egd scheduled on 1/3/23 at [facility] with Dr Alba Bal  Informed [facility] would be calling 1-2 days prior with the arrival time  Informed of clear liquid diet day prior as well as the bowel cleansing preparation  Informed would need a  the day of the procedure due to being under sedation  I asked pt to please call back if has not received instructions or if has any questions

## 2022-12-27 NOTE — RESULT ENCOUNTER NOTE
Please call the patient regarding his abnormal result  Ultrasound suggest scarring in the liver  He is also supposed to call and have EGD colonoscopy done after the holidays  We can discuss more details of the ultrasound findings when he comes in for the visit  If he wants we can also make an office visit with him

## 2022-12-29 ENCOUNTER — TELEPHONE (OUTPATIENT)
Dept: GASTROENTEROLOGY | Facility: CLINIC | Age: 52
End: 2022-12-29

## 2022-12-29 NOTE — TELEPHONE ENCOUNTER
I lmom for pt to please call back to go over some health questions that were missed when pt was scheduled (asc assessment)  Will call pt again tomorrow if do not hear back from him

## 2022-12-29 NOTE — TELEPHONE ENCOUNTER
----- Message from Peña Elena RN sent at 12/28/2022  2:29 PM EST -----  Need backup cant locate checklist, test scheduled for Tues Konstantin 3, 2023  Thanks    Allen Ruiz RN

## 2022-12-29 NOTE — TELEPHONE ENCOUNTER
Patient is returning a missed call regarding Procedure Questions  Patient thought he had an Appointment for tomorrow also  Please call Patient back today  Patient states a text can be sent if he does not answer

## 2022-12-30 NOTE — TELEPHONE ENCOUNTER
I lmom asking pt to please call back to go over health questions for procedures on 1/3/23  I informed if do not hear back from him then [facility] will most likely be asking questions at procedure

## 2022-12-30 NOTE — TELEPHONE ENCOUNTER
I returned pt's call, lmduane apologizing for missing him and asked him to please call back  If pt calls back, please either as the Juan Moraes Assessment questions or put him through to the office so I may ask them  Thank you so much for your help!

## 2022-12-30 NOTE — TELEPHONE ENCOUNTER
Patient is requesting an urgent call back  Patient states that he is off this morning and would like to get everything done before his procedure  Please call patient to follow up

## 2022-12-30 NOTE — TELEPHONE ENCOUNTER
I lmom informing pt that unfortunately I do not have texting access and asked him to please call back to go over the health questions (asc assessment)

## 2023-01-03 ENCOUNTER — ANESTHESIA EVENT (OUTPATIENT)
Dept: GASTROENTEROLOGY | Facility: HOSPITAL | Age: 53
End: 2023-01-03

## 2023-01-03 ENCOUNTER — HOSPITAL ENCOUNTER (OUTPATIENT)
Dept: GASTROENTEROLOGY | Facility: HOSPITAL | Age: 53
Setting detail: OUTPATIENT SURGERY
Discharge: HOME | End: 2023-01-03
Attending: INTERNAL MEDICINE

## 2023-01-03 ENCOUNTER — ANESTHESIA EVENT (OUTPATIENT)
Dept: GASTROENTEROLOGY | Facility: AMBULATORY SURGERY CENTER | Age: 53
End: 2023-01-03

## 2023-01-03 ENCOUNTER — ANESTHESIA (OUTPATIENT)
Dept: GASTROENTEROLOGY | Facility: HOSPITAL | Age: 53
End: 2023-01-03

## 2023-01-03 ENCOUNTER — HOSPITAL ENCOUNTER (OUTPATIENT)
Dept: GASTROENTEROLOGY | Facility: AMBULATORY SURGERY CENTER | Age: 53
Discharge: HOME | End: 2023-01-03

## 2023-01-03 ENCOUNTER — ANESTHESIA (OUTPATIENT)
Dept: GASTROENTEROLOGY | Facility: AMBULATORY SURGERY CENTER | Age: 53
End: 2023-01-03

## 2023-01-03 VITALS
WEIGHT: 200 LBS | RESPIRATION RATE: 18 BRPM | SYSTOLIC BLOOD PRESSURE: 160 MMHG | DIASTOLIC BLOOD PRESSURE: 77 MMHG | HEIGHT: 70 IN | HEART RATE: 55 BPM | BODY MASS INDEX: 28.63 KG/M2 | OXYGEN SATURATION: 97 % | TEMPERATURE: 97.2 F

## 2023-01-03 VITALS
SYSTOLIC BLOOD PRESSURE: 141 MMHG | BODY MASS INDEX: 30.81 KG/M2 | DIASTOLIC BLOOD PRESSURE: 80 MMHG | OXYGEN SATURATION: 100 % | TEMPERATURE: 97.7 F | RESPIRATION RATE: 18 BRPM | HEART RATE: 74 BPM | HEIGHT: 69 IN | WEIGHT: 208 LBS

## 2023-01-03 DIAGNOSIS — Z12.11 SCREEN FOR COLON CANCER: ICD-10-CM

## 2023-01-03 DIAGNOSIS — K74.69 OTHER CIRRHOSIS OF LIVER (HCC): ICD-10-CM

## 2023-01-03 DIAGNOSIS — K21.9 GASTROESOPHAGEAL REFLUX DISEASE WITHOUT ESOPHAGITIS: ICD-10-CM

## 2023-01-03 RX ORDER — PANTOPRAZOLE SODIUM 40 MG/1
40 TABLET, DELAYED RELEASE ORAL DAILY
Qty: 30 TABLET | Refills: 2 | Status: SHIPPED | OUTPATIENT
Start: 2023-01-03 | End: 2023-01-09 | Stop reason: SDUPTHER

## 2023-01-03 RX ORDER — LIDOCAINE HYDROCHLORIDE 20 MG/ML
INJECTION, SOLUTION EPIDURAL; INFILTRATION; INTRACAUDAL; PERINEURAL AS NEEDED
Status: DISCONTINUED | OUTPATIENT
Start: 2023-01-03 | End: 2023-01-03

## 2023-01-03 RX ORDER — PROPOFOL 10 MG/ML
INJECTION, EMULSION INTRAVENOUS AS NEEDED
Status: DISCONTINUED | OUTPATIENT
Start: 2023-01-03 | End: 2023-01-03

## 2023-01-03 RX ORDER — GLYCOPYRROLATE 0.2 MG/ML
INJECTION INTRAMUSCULAR; INTRAVENOUS AS NEEDED
Status: DISCONTINUED | OUTPATIENT
Start: 2023-01-03 | End: 2023-01-03

## 2023-01-03 RX ORDER — SIMETHICONE 20 MG/.3ML
EMULSION ORAL AS NEEDED
Status: COMPLETED | OUTPATIENT
Start: 2023-01-03 | End: 2023-01-03

## 2023-01-03 RX ORDER — SODIUM CHLORIDE, SODIUM LACTATE, POTASSIUM CHLORIDE, CALCIUM CHLORIDE 600; 310; 30; 20 MG/100ML; MG/100ML; MG/100ML; MG/100ML
INJECTION, SOLUTION INTRAVENOUS CONTINUOUS PRN
Status: DISCONTINUED | OUTPATIENT
Start: 2023-01-03 | End: 2023-01-03

## 2023-01-03 RX ORDER — PROPOFOL 10 MG/ML
INJECTION, EMULSION INTRAVENOUS CONTINUOUS PRN
Status: DISCONTINUED | OUTPATIENT
Start: 2023-01-03 | End: 2023-01-03

## 2023-01-03 RX ADMIN — PROPOFOL 140 MCG/KG/MIN: 10 INJECTION, EMULSION INTRAVENOUS at 12:10

## 2023-01-03 RX ADMIN — LIDOCAINE HYDROCHLORIDE 100 MG: 20 INJECTION, SOLUTION EPIDURAL; INFILTRATION; INTRACAUDAL; PERINEURAL at 11:57

## 2023-01-03 RX ADMIN — PROPOFOL 100 MG: 10 INJECTION, EMULSION INTRAVENOUS at 12:10

## 2023-01-03 RX ADMIN — Medication 40 MG: at 12:15

## 2023-01-03 RX ADMIN — PROPOFOL 150 MG: 10 INJECTION, EMULSION INTRAVENOUS at 11:57

## 2023-01-03 RX ADMIN — SODIUM CHLORIDE, SODIUM LACTATE, POTASSIUM CHLORIDE, AND CALCIUM CHLORIDE: .6; .31; .03; .02 INJECTION, SOLUTION INTRAVENOUS at 11:29

## 2023-01-03 RX ADMIN — PROPOFOL 50 MG: 10 INJECTION, EMULSION INTRAVENOUS at 11:58

## 2023-01-03 RX ADMIN — PROPOFOL 50 MG: 10 INJECTION, EMULSION INTRAVENOUS at 12:04

## 2023-01-03 RX ADMIN — GLYCOPYRROLATE 0.2 MG: 0.2 INJECTION, SOLUTION INTRAMUSCULAR; INTRAVENOUS at 11:57

## 2023-01-03 RX ADMIN — PROPOFOL 40 MG: 10 INJECTION, EMULSION INTRAVENOUS at 12:19

## 2023-01-03 NOTE — H&P
History and Physical - [facility]  Melyssa Amayaus Rm  47 y o  male MRN: [id number]                  HPI: Karron Bumpers  is a 47y o  year old male who presents for screening colonoscopy, history of cirrhosis to screen for varices, history of GERD      REVIEW OF SYSTEMS: Per the HPI, and otherwise unremarkable  Historical Information   Past Medical History:   Diagnosis Date   • Cardiac defibrillator in place    • CHF (congestive heart failure) (HCC)    • Colon polyp    • GERD (gastroesophageal reflux disease)    • Hypertension    • Seizure (HCC)    • Thrombocytopenia (HCC)      Past Surgical History:   Procedure Laterality Date   • APPENDECTOMY     • COLONOSCOPY       Social History   Social History     Substance and Sexual Activity   Alcohol Use Yes   • Alcohol/week: 2 0 standard drinks   • Types: 2 Shots of liquor per week    Comment: bourbon or vodka     Social History     Substance and Sexual Activity   Drug Use No     Social History     Tobacco Use   Smoking Status Some Days   • Packs/day: 0 25   • Types: Cigarettes   Smokeless Tobacco Never     Family History   Problem Relation Age of Onset   • Hypertension Maternal Grandmother    • Asthma Son    • Heart attack Neg Hx    • Stroke Neg Hx    • Aneurysm Neg Hx    • Arrhythmia Neg Hx    • Clotting disorder Neg Hx    • Hyperlipidemia Neg Hx        Meds/Allergies     (Not in a hospital admission)      No Known Allergies    Objective     /72   Pulse 59   Temp 98 3 °F (36 8 °C) (Temporal)   Resp 14   Ht 5' 9" (1 753 m)   Wt 94 3 kg (208 lb)   SpO2 100%   BMI 30 72 kg/m²       PHYSICAL EXAM    Gen: NAD  CV: RRR  CHEST: Clear  ABD: soft, NT/ND  EXT: no edema      ASSESSMENT/PLAN:  This is a 47y o  year old male here for EGD and colonoscopy, and he is stable and optimized for his procedure

## 2023-01-03 NOTE — ANESTHESIA PREPROCEDURE EVALUATION
Procedure:  COLONOSCOPY  EGD    Relevant Problems   CARDIO   (+) Benign essential hypertension      GI/HEPATIC   (+) GERD (gastroesophageal reflux disease)   (+) Other cirrhosis of liver (HCC)      HEMATOLOGY   (+) Thrombocytopenia (HCC)      NEURO/PSYCH   (+) Generalized convulsive seizure (HCC)   (+) Seizure Like Episode ([facility] [address] )      Nervous and Auditory   (+) Multiple lacunar infarcts (HCC)      Other   (+) Cardiac defibrillator in place             Anesthesia Plan  ASA Score- 3     Anesthesia Type- IV sedation with anesthesia with ASA Monitors  Additional Monitors:   Airway Plan:           Plan Factors-    Chart reviewed  Induction- intravenous  Postoperative Plan-     Informed Consent- Anesthetic plan and risks discussed with patient  I personally reviewed this patient with the CRNA  Discussed and agreed on the Anesthesia Plan with the CRNA  Kallie Gamez

## 2023-01-03 NOTE — PROGRESS NOTES
The patient has h/o HTN,CHF with AICD  But the patient did not followed up with cardiologist for long time  He has history of Seizure, the last episode was one or two years ago ( he could not recalled)  He did not take seizure meds for two days  He came to [facility] for EGD and Colonoscopy because of cirrhosis  I discussed the case with Dr Nell Liang  We think that it is better to do the case in hospital  Explained it to patient and understood  The case will be done [facility] today

## 2023-01-03 NOTE — ANESTHESIA POSTPROCEDURE EVALUATION
Post-Op Assessment Note    CV Status:  Stable  Pain Score: 0    Pain management: adequate     Mental Status:  Sleepy   Hydration Status:  Euvolemic   PONV Controlled:  Controlled   Airway Patency:  Patent      Post Op Vitals Reviewed: Yes      Staff: Anesthesiologist, CRNA         No notable events documented      /72 (01/03/23 1227)    Temp      Pulse (!) 113 (01/03/23 1227)   Resp 12 (01/03/23 1227)    SpO2 99 % (01/03/23 1227)

## 2023-01-09 DIAGNOSIS — K29.70 HELICOBACTER POSITIVE GASTRITIS: Primary | ICD-10-CM

## 2023-01-09 DIAGNOSIS — K21.9 GASTROESOPHAGEAL REFLUX DISEASE WITHOUT ESOPHAGITIS: ICD-10-CM

## 2023-01-09 DIAGNOSIS — B96.81 HELICOBACTER POSITIVE GASTRITIS: Primary | ICD-10-CM

## 2023-01-09 RX ORDER — AMOXICILLIN 500 MG/1
1000 CAPSULE ORAL EVERY 12 HOURS SCHEDULED
Qty: 56 CAPSULE | Refills: 0 | Status: SHIPPED | OUTPATIENT
Start: 2023-01-09 | End: 2023-01-23

## 2023-01-09 RX ORDER — PANTOPRAZOLE SODIUM 40 MG/1
40 TABLET, DELAYED RELEASE ORAL
Qty: 28 TABLET | Refills: 0 | Status: SHIPPED | OUTPATIENT
Start: 2023-01-09 | End: 2023-01-23

## 2023-01-09 RX ORDER — CLARITHROMYCIN 500 MG/1
500 TABLET, COATED ORAL EVERY 12 HOURS SCHEDULED
Qty: 28 TABLET | Refills: 0 | Status: SHIPPED | OUTPATIENT
Start: 2023-01-09 | End: 2023-01-23

## 2023-01-10 ENCOUNTER — TELEPHONE (OUTPATIENT)
Dept: OTHER | Facility: OTHER | Age: 53
End: 2023-01-10

## 2023-01-16 NOTE — TELEPHONE ENCOUNTER
Per patient's phone call, he was given 2 different Rx for Pantoprazole  Once Rx says to take 1X per day and the second Rx says to take 2X per day  Patient would like a call to discuss the reason for the dosage change

## 2023-01-17 DIAGNOSIS — B96.81 HELICOBACTER POSITIVE GASTRITIS: Primary | ICD-10-CM

## 2023-01-17 DIAGNOSIS — K29.70 HELICOBACTER POSITIVE GASTRITIS: Primary | ICD-10-CM

## 2023-01-17 NOTE — TELEPHONE ENCOUNTER
Patient is returning a call back from [facility] regarding the questions he has on his medication

## 2023-04-01 DIAGNOSIS — R56.9 GENERALIZED CONVULSIVE SEIZURE (HCC): ICD-10-CM

## 2023-04-03 ENCOUNTER — TELEPHONE (OUTPATIENT)
Dept: INTERNAL MEDICINE CLINIC | Facility: CLINIC | Age: 53
End: 2023-04-03

## 2023-04-03 RX ORDER — LEVETIRACETAM 1000 MG/1
TABLET ORAL
Qty: 180 TABLET | Refills: 0 | Status: SHIPPED | OUTPATIENT
Start: 2023-04-03

## 2023-04-03 NOTE — TELEPHONE ENCOUNTER
Please call pt to come in for follow up  He was here after a seizure like activity in August and was supposed to come back in September  I received a refill request for his seizure med   I will request refill but he should follow up is also due to for physical  Thank you

## 2023-04-03 NOTE — TELEPHONE ENCOUNTER
Please review for refill  I have also requested a follow up visit pt not in since August 2022  Thank you

## 2023-05-09 ENCOUNTER — OFFICE VISIT (OUTPATIENT)
Dept: INTERNAL MEDICINE CLINIC | Facility: CLINIC | Age: 53
End: 2023-05-09

## 2023-05-09 VITALS
TEMPERATURE: 97.8 F | HEART RATE: 73 BPM | OXYGEN SATURATION: 98 % | DIASTOLIC BLOOD PRESSURE: 52 MMHG | WEIGHT: 206 LBS | SYSTOLIC BLOOD PRESSURE: 114 MMHG | RESPIRATION RATE: 20 BRPM | HEIGHT: 69 IN | BODY MASS INDEX: 30.51 KG/M2

## 2023-05-09 DIAGNOSIS — K74.69 OTHER CIRRHOSIS OF LIVER (HCC): ICD-10-CM

## 2023-05-09 DIAGNOSIS — I10 BENIGN ESSENTIAL HYPERTENSION: ICD-10-CM

## 2023-05-09 DIAGNOSIS — H91.92 DECREASED HEARING OF LEFT EAR: Primary | ICD-10-CM

## 2023-05-09 DIAGNOSIS — A04.8 HELICOBACTER PYLORI INFECTION: ICD-10-CM

## 2023-05-09 PROBLEM — K27.9 H PYLORI ULCER: Status: ACTIVE | Noted: 2023-05-09

## 2023-05-09 PROBLEM — B96.81 H PYLORI ULCER: Status: ACTIVE | Noted: 2023-05-09

## 2023-05-09 NOTE — PROGRESS NOTES
Name: Faisal Liang  : 1970      MRN: [id number]  Encounter Provider: Charlette Crenshaw MD  Encounter Date: 2023   Encounter department: [address] [place] [address]  Decreased hearing of left ear  Assessment & Plan:  · one month history of decreased left ear hearing no associated symptoms such as otalgia, tinnitus, lightheadedness or dizziness  No history of recurrent ear infections, no nasal congestion or rhinorrhea  · History of high noise exposure, works at  [employer]  · PE with cerumen impact Tatian mostly around the 1-2:00 and 7-8 o'clock  Tympanic membrane intact no effusion noted  · Plan  · Unsuccessful cerumen removal on this visit  · Advised to continue with Debrox otic solution twice a day for 4 days  · At this time doubt sensoneural deficit I suspect conduction deficit  However if no improvement recommend hearing test, patient will like to defer testing at this time  · Patient advice conservative management upon exposure loud noise  · If recurrence recommend ENT evaluation  Orders:  -     carbamide peroxide (DEBROX) 6 5 % otic solution; Administer 5 drops into the left ear 2 (two) times a day for 4 days    2  Other cirrhosis of liver [facility])  Assessment & Plan:  · Dx cirrhois of liver base on imagining Ct abdomen 2022  · RUQ US and US elastography consisten with heterogenous fibrotic liver disease  · Unclear etiology at this time, patient has not been compliant with workup and office visits  · Has been seen by GI on the outpatient setting  · EGD 2022 with mild portal hypertensive gastropathy, no esopagheal varices and + h  Pylori on biopsies taken  · Plan:  · Patient encourage to get blood work done and follow up in 4 weeks as importance stressed determined etiology of liver disease for further management  Patient verbalized understanding  · Patient will require strict follow ups monitor AFP and US q 6monts  3  Helicobacter pylori infection  Assessment & Plan:  · + H pylori as evidence by recent EGD biopsies  · Prescribed by GI triple therapy as per patient completed 14 day course  · No H  Pylori stool collected  · Plan:  · Follow up collection determined eradication of infection  · Currently no PPI tx      4  Benign essential hypertension  Assessment & Plan:  Blood Pressure: 114/52  ·   · Acceptable  · Continue Losartan 25mg and Zebeta 5mg      BMI Counseling: Body mass index is 30 42 kg/m²  The BMI is above normal  Nutrition recommendations include decreasing portion sizes, moderation in carbohydrate intake and increasing intake of lean protein  Exercise recommendations include moderate physical activity 150 minutes/week  Rationale for BMI follow-up plan is due to patient being overweight or obese  Depression Screening and Follow-up Plan: Patient was screened for depression during today's encounter  They screened negative with a PHQ-2 score of 0  Subjective      47year old male with pmhx of Generalized seizure disorder, non ischemic cardiomyopathy w/ICD, liver cirrhosis unknown etiology, HTN who presents due to one month hx of decrease hearing on left ear w/out associated symptoms such as dizziness, lightheadeness, vertigo, tinnitus, ear pain, nasal or ear congestion  Endorse exposure to loud noises as he works on a [employer] facility for [employer]  Denies any recent trauma  Endorse that tents to use Qtips for cleaning his ears however denies usage over the past 1-2 weeks  Patient had recent EGD/Colonoscopy done December 2022 with +biopsies H/ pylori for which endorse completion of abx tx  Also had polypectomy wit expectation to follow up with repeat colonoscopy in 3 years  Patient endorse that was not aware of further testing needed for cirrhosis for which he has not get work up done       Patient has had poor compliance follow with specialist including Neurology for Seizure disorder however endorse compliance with medications  Denies any seizure events since August 2022  Endorse that follows with Cardiology on the outpatient setting, due for visit for nonischemic cardiomypathy and ICD functionality  Denies any other symptoms such as HA, palpitations, chest pain, dyspnea, abdominal pain as well as no abdominal distention, changes in color/frequency of stooling as well as no urinary symptoms  Review of Systems   Constitutional: Negative for activity change, appetite change, chills and fever  HENT: Positive for hearing loss  Negative for congestion, ear discharge, ear pain, rhinorrhea, sinus pressure, sinus pain, sore throat, tinnitus and trouble swallowing  Left hearing loss  Eyes: Negative for pain and visual disturbance  Respiratory: Negative for cough and shortness of breath  Cardiovascular: Negative for chest pain, palpitations and leg swelling  Gastrointestinal: Negative for abdominal pain, constipation, diarrhea, nausea and vomiting  Genitourinary: Negative for difficulty urinating, dysuria and hematuria  Musculoskeletal: Negative for arthralgias and back pain  Skin: Negative for color change and rash  Neurological: Negative for dizziness, seizures, syncope and headaches  All other systems reviewed and are negative        Current Outpatient Medications on File Prior to Visit   Medication Sig   • acetaminophen (TYLENOL) 325 mg tablet Take 650 mg by mouth every 8 (eight) hours   • bisoprolol (ZEBETA) 5 mg tablet TAKE 1 TABLET BY MOUTH EVERY DAY   • levETIRAcetam (KEPPRA) 1000 MG tablet TAKE 1 TABLET BY MOUTH TWICE A DAY   • losartan (COZAAR) 25 mg tablet Take 1 tablet by mouth daily   • nicotine (NICODERM CQ) 14 mg/24hr TD 24 hr patch Place 1 patch on the skin every 24 hours   • Elastic Bandages & Supports (Rib Belt/Mens Large) MISC Use in the morning   • Magnesium Gluconate 550 MG TABS Take 30 mg by mouth (Patient not taking: Reported on 12/9/2022)   • "pantoprazole (PROTONIX) 40 mg tablet Take 1 tablet (40 mg total) by mouth 2 (two) times a day before breakfast and lunch for 14 days   • potassium chloride (MICRO-K) 10 MEQ CR capsule Take 1 capsule by mouth daily as needed   • [DISCONTINUED] dicyclomine (BENTYL) 10 mg capsule Take 1 capsule (10 mg total) by mouth 3 (three) times a day before meals       Objective     /52 (BP Location: Right arm, Patient Position: Sitting, Cuff Size: Large)   Pulse 73   Temp 97 8 °F (36 6 °C)   Resp 20   Ht 5' 9\" (1 753 m)   Wt 93 4 kg (206 lb)   SpO2 98%   BMI 30 42 kg/m²     Physical Exam  Vitals and nursing note reviewed  Constitutional:       General: He is not in acute distress  Appearance: Normal appearance  He is not ill-appearing  HENT:      Head: Normocephalic and atraumatic  Right Ear: Tympanic membrane and ear canal normal  There is no impacted cerumen  Left Ear: Tympanic membrane, ear canal and external ear normal  There is impacted cerumen  Nose: Nose normal       Mouth/Throat:      Mouth: Mucous membranes are moist    Eyes:      General: No scleral icterus  Extraocular Movements: Extraocular movements intact  Conjunctiva/sclera: Conjunctivae normal       Pupils: Pupils are equal, round, and reactive to light  Cardiovascular:      Rate and Rhythm: Normal rate and regular rhythm  Pulses: Normal pulses  Heart sounds: Normal heart sounds  Pulmonary:      Effort: Pulmonary effort is normal       Breath sounds: Normal breath sounds  Abdominal:      General: Bowel sounds are normal  There is no distension  Palpations: There is no mass  Tenderness: There is no abdominal tenderness  There is no right CVA tenderness or left CVA tenderness  Musculoskeletal:         General: Normal range of motion  Cervical back: Normal range of motion and neck supple  Right lower leg: No edema  Left lower leg: No edema     Skin:     General: Skin is warm and " dry       Coloration: Skin is not jaundiced  Neurological:      General: No focal deficit present  Mental Status: He is alert and oriented to person, place, and time     Psychiatric:         Mood and Affect: Mood normal          Behavior: Behavior normal        Sloan Joyce MD

## 2023-05-09 NOTE — ASSESSMENT & PLAN NOTE
· Dx cirrhois of liver base on imagining Ct abdomen August 2022  · RUQ US and US elastography consisten with heterogenous fibrotic liver disease  · Unclear etiology at this time, patient has not been compliant with workup and office visits  · Has been seen by GI on the outpatient setting  · EGD 12/2022 with mild portal hypertensive gastropathy, no esopagheal varices and + h  Pylori on biopsies taken  · Plan:  · Patient encourage to get blood work done and follow up in 4 weeks as importance stressed determined etiology of liver disease for further management  Patient verbalized understanding  · Patient will require strict follow ups monitor AFP and US q 6monts

## 2023-05-09 NOTE — ASSESSMENT & PLAN NOTE
· + H pylori as evidence by recent EGD biopsies  · Prescribed by GI triple therapy as per patient completed 14 day course  · No H  Pylori stool collected  · Plan:  · Follow up collection determined eradication of infection     · Currently no PPI tx

## 2023-05-09 NOTE — PATIENT INSTRUCTIONS
Please have blood work requested prior to appointment  Iron panel, AFP, JOSÉ LUIS, Antismooth, yumiko 1 antitrypsin, lipid panel, hemoglobin a 1c

## 2023-05-09 NOTE — ASSESSMENT & PLAN NOTE
· one month history of decreased left ear hearing no associated symptoms such as otalgia, tinnitus, lightheadedness or dizziness  No history of recurrent ear infections, no nasal congestion or rhinorrhea  · History of high noise exposure, works at  [employer]  · PE with cerumen impact Tatian mostly around the 1-2:00 and 7-8 o'clock  Tympanic membrane intact no effusion noted  · Plan  · Unsuccessful cerumen removal on this visit  · Advised to continue with Debrox otic solution twice a day for 4 days  · At this time doubt sensoneural deficit I suspect conduction deficit  However if no improvement recommend hearing test, patient will like to defer testing at this time  · Patient advice conservative management upon exposure loud noise  · If recurrence recommend ENT evaluation

## 2023-07-01 DIAGNOSIS — R56.9 GENERALIZED CONVULSIVE SEIZURE (HCC): ICD-10-CM

## 2023-07-03 RX ORDER — LEVETIRACETAM 1000 MG/1
TABLET ORAL
Qty: 180 TABLET | Refills: 0 | Status: SHIPPED | OUTPATIENT
Start: 2023-07-03

## 2023-11-17 ENCOUNTER — TELEPHONE (OUTPATIENT)
Dept: GASTROENTEROLOGY | Facility: CLINIC | Age: 53
End: 2023-11-17

## 2023-11-17 NOTE — TELEPHONE ENCOUNTER
Pt is due for an EGD with Dr. Fleming Points for hx of H. Pylori infection / cirrhosis of liver, however, he is no longer with the network. I lmom informing pt of this and asked him to please call back to schedule an EGD which we could do with one of his partners. Will call pt again if do not hear back from him.

## 2024-10-29 NOTE — PROGRESS NOTES
SQ ICD LATITUDE TRANSMISSION: BATTERY STATUS "OK"- 74% REMAINING BATTERY LIFE  ALL AVAILABLE LEAD PARAMETERS WITHIN NORMAL LIMITS  NO SIGNIFICANT HIGH RATE EPISODES  NORMAL DEVICE FUNCTION   GV 29-Oct-2024 N/A

## 2025-02-27 DIAGNOSIS — I42.0 DILATED CARDIOMYOPATHY (HCC): ICD-10-CM

## 2025-02-27 DIAGNOSIS — R56.9 GENERALIZED CONVULSIVE SEIZURE (HCC): ICD-10-CM

## 2025-02-27 RX ORDER — LEVETIRACETAM 1000 MG/1
1000 TABLET ORAL 2 TIMES DAILY
Qty: 180 TABLET | Refills: 0 | Status: SHIPPED | OUTPATIENT
Start: 2025-02-27

## 2025-02-27 RX ORDER — BISOPROLOL FUMARATE 5 MG/1
5 TABLET, FILM COATED ORAL DAILY
Qty: 90 TABLET | Refills: 0 | OUTPATIENT
Start: 2025-02-27

## 2025-02-27 NOTE — TELEPHONE ENCOUNTER
Reason for call:   [x] Refill   [] Prior Auth  [] Other:     Office: [facility]   [] PCP/Provider -   [x] Specialty/Provider - Cardiology/ Thiago Lin     Medication: bisoprolol     Dose/Frequency: 5 mg/ daily     Quantity: 90 day supply     Pharmacy: [facility] in [place], [place]     Does the patient have enough for 3 days?   [] Yes   [x] No - Send as HP to POD

## 2025-02-27 NOTE — TELEPHONE ENCOUNTER
Reason for call:   [x] Refill   [] Prior Auth  [] Other:     Office: [facility]   [x] PCP/Provider - Elizabeth Rajput   [] Specialty/Provider -     Medication: levETIRAcetam (KEPPRA) 1000 MG tablet     Dose/Frequency: 1,000 mg/ twice daily     Quantity: 90 day supply     Pharmacy: [facility] in [place], [place]     Does the patient have enough for 3 days?   [] Yes   [x] No - Send as HP to POD

## 2025-03-26 DIAGNOSIS — I42.0 DILATED CARDIOMYOPATHY (HCC): ICD-10-CM

## 2025-03-26 RX ORDER — BISOPROLOL FUMARATE 5 MG/1
5 TABLET, FILM COATED ORAL DAILY
Qty: 30 TABLET | Refills: 0 | Status: SHIPPED | OUTPATIENT
Start: 2025-03-26

## 2025-03-26 NOTE — TELEPHONE ENCOUNTER
Please send to provider - Patient going to schedule a telehealth appointment due to being in [place] taking care of his mother, whom unexpectedly had to have an extremity amputation. Uncertain as to how long he will be there providing care.   Please send a refill to get him to his appointment, if able. Thank you.     Reason for call:   [x] Refill   [] Prior Auth  [] Other:     Office:   [] PCP/Provider -   [x] Specialty/Provider - Thiago Lin MD / [facility]     Medication: bisoprolol (ZEBETA) 5 mg tablet /  TAKE 1 TABLET BY MOUTH EVERY DAY     Pharmacy: [id number] [place] [id number] [facility]   Does the patient have enough for 3 days?   [] Yes   [x] No - Send as HP to POD

## 2025-03-26 NOTE — TELEPHONE ENCOUNTER
Inquiring on if pcp will maintain refills moving forward.     Please contact patient to scheduled a telehealth appointment, thank you.   Last OV: 5/9/2023

## 2025-03-27 NOTE — TELEPHONE ENCOUNTER
Patient returned call to schedule virtual while in [facility].  Contacted clinical who will call patient back shortly.